# Patient Record
Sex: FEMALE | Race: BLACK OR AFRICAN AMERICAN | NOT HISPANIC OR LATINO | Employment: OTHER | URBAN - METROPOLITAN AREA
[De-identification: names, ages, dates, MRNs, and addresses within clinical notes are randomized per-mention and may not be internally consistent; named-entity substitution may affect disease eponyms.]

---

## 2023-02-24 LAB
ALANINE AMINOTRANSFERASE (SGPT) (U/L) IN SER/PLAS: 21 U/L (ref 7–45)
ALBUMIN (G/DL) IN SER/PLAS: 4.3 G/DL (ref 3.4–5)
ALKALINE PHOSPHATASE (U/L) IN SER/PLAS: 92 U/L (ref 33–136)
ANION GAP IN SER/PLAS: 12 MMOL/L (ref 10–20)
ASPARTATE AMINOTRANSFERASE (SGOT) (U/L) IN SER/PLAS: 17 U/L (ref 9–39)
BILIRUBIN TOTAL (MG/DL) IN SER/PLAS: 0.5 MG/DL (ref 0–1.2)
CALCIDIOL (25 OH VITAMIN D3) (NG/ML) IN SER/PLAS: 35 NG/ML
CALCIUM (MG/DL) IN SER/PLAS: 9.7 MG/DL (ref 8.6–10.6)
CARBON DIOXIDE, TOTAL (MMOL/L) IN SER/PLAS: 25 MMOL/L (ref 21–32)
CHLORIDE (MMOL/L) IN SER/PLAS: 110 MMOL/L (ref 98–107)
CREATININE (MG/DL) IN SER/PLAS: 0.75 MG/DL (ref 0.5–1.05)
GFR FEMALE: 89 ML/MIN/1.73M2
GLUCOSE (MG/DL) IN SER/PLAS: 117 MG/DL (ref 74–99)
POTASSIUM (MMOL/L) IN SER/PLAS: 4.4 MMOL/L (ref 3.5–5.3)
PROTEIN TOTAL: 6.6 G/DL (ref 6.4–8.2)
SODIUM (MMOL/L) IN SER/PLAS: 143 MMOL/L (ref 136–145)
THYROTROPIN (MIU/L) IN SER/PLAS BY DETECTION LIMIT <= 0.05 MIU/L: 0.82 MIU/L (ref 0.44–3.98)
THYROXINE (T4) FREE (NG/DL) IN SER/PLAS: 1.31 NG/DL (ref 0.78–1.48)
UREA NITROGEN (MG/DL) IN SER/PLAS: 13 MG/DL (ref 6–23)

## 2023-02-27 LAB
THYROGLOBULIN AB (IU/ML) IN SER/PLAS: <0.9 IU/ML (ref 0–4)
THYROGLOBULIN LC-MS/MS: ABNORMAL NG/ML (ref 1.3–31.8)
THYROGLOBULIN: 0.1 NG/ML (ref 1.3–31.8)

## 2023-03-07 ENCOUNTER — TELEPHONE (OUTPATIENT)
Dept: PRIMARY CARE | Facility: CLINIC | Age: 63
End: 2023-03-07
Payer: MEDICAID

## 2023-03-07 RX ORDER — LEVOTHYROXINE SODIUM 125 UG/1
1 TABLET ORAL DAILY
COMMUNITY
Start: 2016-04-18 | End: 2023-10-13 | Stop reason: SDUPTHER

## 2023-03-07 RX ORDER — ALBUTEROL SULFATE 90 UG/1
AEROSOL, METERED RESPIRATORY (INHALATION)
COMMUNITY
Start: 2015-03-18

## 2023-03-07 RX ORDER — ATORVASTATIN CALCIUM 20 MG/1
1 TABLET, FILM COATED ORAL DAILY
COMMUNITY
Start: 2014-11-13 | End: 2023-03-11 | Stop reason: SDUPTHER

## 2023-03-07 RX ORDER — IPRATROPIUM BROMIDE AND ALBUTEROL SULFATE 2.5; .5 MG/3ML; MG/3ML
3 SOLUTION RESPIRATORY (INHALATION) 2 TIMES DAILY
COMMUNITY
Start: 2015-10-31 | End: 2023-11-29 | Stop reason: ALTCHOICE

## 2023-03-07 RX ORDER — AMLODIPINE BESYLATE 10 MG/1
1 TABLET ORAL DAILY
COMMUNITY
Start: 2021-08-20 | End: 2023-10-20 | Stop reason: SDUPTHER

## 2023-03-07 RX ORDER — BISACODYL 5 MG
2 TABLET, DELAYED RELEASE (ENTERIC COATED) ORAL DAILY
COMMUNITY
Start: 2013-09-19 | End: 2023-10-13 | Stop reason: SDUPTHER

## 2023-03-07 RX ORDER — SPIRONOLACTONE 25 MG/1
1 TABLET ORAL DAILY
COMMUNITY
End: 2023-10-13 | Stop reason: ALTCHOICE

## 2023-03-07 RX ORDER — TRAZODONE HYDROCHLORIDE 50 MG/1
TABLET ORAL
COMMUNITY
End: 2023-10-13 | Stop reason: ALTCHOICE

## 2023-03-07 RX ORDER — FLUTICASONE PROPIONATE 50 MCG
SPRAY, SUSPENSION (ML) NASAL
COMMUNITY
Start: 2020-07-27

## 2023-03-07 RX ORDER — BISACODYL 5 MG
TABLET, DELAYED RELEASE (ENTERIC COATED) ORAL 2 TIMES DAILY
COMMUNITY
End: 2023-06-13 | Stop reason: SDUPTHER

## 2023-03-07 RX ORDER — CETIRIZINE HYDROCHLORIDE 10 MG/1
1 TABLET ORAL DAILY
COMMUNITY
Start: 2019-07-09 | End: 2024-06-03 | Stop reason: SDUPTHER

## 2023-03-07 RX ORDER — GABAPENTIN 300 MG/1
1 CAPSULE ORAL NIGHTLY
COMMUNITY
Start: 2021-05-20 | End: 2023-10-05 | Stop reason: SDUPTHER

## 2023-03-07 RX ORDER — CYCLOBENZAPRINE HCL 10 MG
TABLET ORAL
COMMUNITY
Start: 2013-09-19 | End: 2023-10-20 | Stop reason: WASHOUT

## 2023-03-07 RX ORDER — LISINOPRIL 40 MG/1
1 TABLET ORAL DAILY
COMMUNITY
Start: 2015-07-29 | End: 2023-10-13 | Stop reason: SDUPTHER

## 2023-03-07 RX ORDER — LYSINE HCL 500 MG
1 TABLET ORAL DAILY
COMMUNITY
Start: 2016-01-20 | End: 2023-11-29 | Stop reason: ALTCHOICE

## 2023-03-07 RX ORDER — VIT C/E/ZN/COPPR/LUTEIN/ZEAXAN 250MG-90MG
CAPSULE ORAL
COMMUNITY
Start: 2022-01-06 | End: 2023-10-20 | Stop reason: WASHOUT

## 2023-03-07 RX ORDER — FUROSEMIDE 40 MG/1
1 TABLET ORAL 2 TIMES DAILY
COMMUNITY
Start: 2013-09-19

## 2023-03-07 RX ORDER — DOCUSATE SODIUM 100 MG/1
1 CAPSULE, LIQUID FILLED ORAL DAILY
COMMUNITY
Start: 2013-09-19 | End: 2024-02-02 | Stop reason: SDUPTHER

## 2023-03-07 RX ORDER — METFORMIN HYDROCHLORIDE 1000 MG/1
TABLET ORAL
COMMUNITY
Start: 2013-11-12 | End: 2023-06-13 | Stop reason: SDUPTHER

## 2023-03-07 RX ORDER — LEVOTHYROXINE SODIUM 112 UG/1
1 TABLET ORAL DAILY
COMMUNITY
Start: 2016-09-03 | End: 2023-10-20 | Stop reason: ALTCHOICE

## 2023-03-07 NOTE — TELEPHONE ENCOUNTER
Patient called the Refill line requesting Atorvastatin to be sent to [facility]

## 2023-03-11 DIAGNOSIS — E78.5 DYSLIPIDEMIA: Primary | ICD-10-CM

## 2023-03-11 RX ORDER — ATORVASTATIN CALCIUM 20 MG/1
20 TABLET, FILM COATED ORAL DAILY
Qty: 90 TABLET | Refills: 2 | Status: SHIPPED | OUTPATIENT
Start: 2023-03-11 | End: 2023-03-11

## 2023-06-13 ENCOUNTER — OFFICE VISIT (OUTPATIENT)
Dept: PRIMARY CARE | Facility: CLINIC | Age: 63
End: 2023-06-13
Payer: MEDICAID

## 2023-06-13 VITALS
HEIGHT: 64 IN | DIASTOLIC BLOOD PRESSURE: 77 MMHG | WEIGHT: 177.2 LBS | SYSTOLIC BLOOD PRESSURE: 124 MMHG | BODY MASS INDEX: 30.25 KG/M2 | TEMPERATURE: 96.7 F | OXYGEN SATURATION: 96 % | HEART RATE: 55 BPM

## 2023-06-13 DIAGNOSIS — E11.69 DIABETES MELLITUS TYPE 2 IN OBESE: ICD-10-CM

## 2023-06-13 DIAGNOSIS — K59.00 CONSTIPATION, UNSPECIFIED CONSTIPATION TYPE: Primary | ICD-10-CM

## 2023-06-13 DIAGNOSIS — E66.9 DIABETES MELLITUS TYPE 2 IN OBESE: ICD-10-CM

## 2023-06-13 PROBLEM — F32.A DEPRESSION: Status: ACTIVE | Noted: 2023-06-13

## 2023-06-13 PROBLEM — E78.5 DYSLIPIDEMIA: Status: ACTIVE | Noted: 2023-06-13

## 2023-06-13 PROBLEM — E89.0 HYPOTHYROIDISM, POSTSURGICAL: Status: ACTIVE | Noted: 2023-06-13

## 2023-06-13 PROBLEM — C73 FOLLICULAR CARCINOMA OF THYROID GLAND (MULTI): Status: ACTIVE | Noted: 2023-06-13

## 2023-06-13 PROBLEM — M54.50 LOW BACK PAIN: Status: ACTIVE | Noted: 2023-06-13

## 2023-06-13 PROBLEM — J43.9 EMPHYSEMA LUNG (MULTI): Status: ACTIVE | Noted: 2023-06-13

## 2023-06-13 PROBLEM — E11.9 ENCOUNTER FOR DIABETIC FOOT EXAM (MULTI): Status: ACTIVE | Noted: 2023-06-13

## 2023-06-13 PROBLEM — K21.9 ESOPHAGEAL REFLUX: Status: ACTIVE | Noted: 2023-06-13

## 2023-06-13 PROCEDURE — 99213 OFFICE O/P EST LOW 20 MIN: CPT | Performed by: STUDENT IN AN ORGANIZED HEALTH CARE EDUCATION/TRAINING PROGRAM

## 2023-06-13 PROCEDURE — 3074F SYST BP LT 130 MM HG: CPT | Performed by: STUDENT IN AN ORGANIZED HEALTH CARE EDUCATION/TRAINING PROGRAM

## 2023-06-13 PROCEDURE — 4010F ACE/ARB THERAPY RXD/TAKEN: CPT | Performed by: STUDENT IN AN ORGANIZED HEALTH CARE EDUCATION/TRAINING PROGRAM

## 2023-06-13 PROCEDURE — 3044F HG A1C LEVEL LT 7.0%: CPT | Performed by: STUDENT IN AN ORGANIZED HEALTH CARE EDUCATION/TRAINING PROGRAM

## 2023-06-13 PROCEDURE — 3078F DIAST BP <80 MM HG: CPT | Performed by: STUDENT IN AN ORGANIZED HEALTH CARE EDUCATION/TRAINING PROGRAM

## 2023-06-13 RX ORDER — BISACODYL 5 MG
5 TABLET, DELAYED RELEASE (ENTERIC COATED) ORAL DAILY PRN
Qty: 30 TABLET | Refills: 3 | Status: SHIPPED | OUTPATIENT
Start: 2023-06-13 | End: 2023-06-13

## 2023-06-13 RX ORDER — METFORMIN HYDROCHLORIDE 1000 MG/1
1000 TABLET ORAL
Qty: 180 TABLET | Refills: 3 | Status: SHIPPED | OUTPATIENT
Start: 2023-06-13 | End: 2023-06-13

## 2023-06-13 ASSESSMENT — PAIN SCALES - GENERAL: PAINLEVEL: 10-WORST PAIN EVER

## 2023-06-13 NOTE — PROGRESS NOTES
"Subjective   Patient ID: Katherine Sin is a 62 y.o. female who presents for Annual Exam.    HPI   62 year female HTN, HFpEF, GERD, follicular thyroid carcinoma s/p thyroidectomy in 2012, T2DM, COPD presenting for paperwork to her dentist.       #T2DM  Last A1c: was at goal 5 months ago  Medications:    Metformin 2 g daily  Along with diet control  Currently denies any symptoms     #HTN  At goal   Medications:   Amlodipine 10 mg daily   Lisinopril 40 mg daily   Denies headaches, vision changes, chest pain, leg swelling      #Chronic constipation  - Patient is reporting chronic constipation with better improvement of her symptoms and requesting refill.      Review of Systems  Review of systems negative unless stated in HPI  Objective   Blood Pressure 124/77 (BP Location: Right arm, Patient Position: Sitting)   Pulse 55   Temperature 35.9 °C (96.7 °F) (Temporal)   Height 1.626 m (5' 4\")   Weight 80.4 kg (177 lb 3.2 oz)   Oxygen Saturation 96%   Body Mass Index 30.42 kg/m²     Physical Exam  Constitutional: Well developed, awake, alert, oriented x3  Head and Face: NCAT  Eyes: Normal external exam, EOMI  Cardiovascular: RRR, S1/S2, no murmurs, rubs, or gallops, radial pulses +2, no edema of extremities  Pulmonary: CTAB, no respiratory distress.  Abdomen: +BS, soft, non-tender, nondistended, no guarding or rebound, no masses noted      Assessment/Plan   62 year female HTN, HFpEF, GERD, follicular thyroid carcinoma s/p thyroidectomy in 2012, T2DM, COPD presenting for paperwork to her dentist.  Medical conditions, refilled Dulcolax and metformin     Patient is reporting multiple dental concerning and provided paperwork from her internist requesting documentation of the current medical condition status fascially diabetes and hypertension.  Both are controlled on current medications.    Diabetes mellitus type 2 in obese (CMS/[facility])  -Controlled  -     metFORMIN (Glucophage) 1,000 mg tablet; Take 1 tablet (1,000 mg) by " mouth 2 times a day with meals.    Constipation, unspecified constipation type  -     bisacodyl (Dulcolax) 5 mg EC tablet; Take 1 tablet (5 mg) by mouth once daily as needed for constipation.  -Discussed increase fiber in diet and proper water intake    Hypertension  - Controlled  - Continue current medication regimen    Return to clinic in 8 to 12 weeks or sooner as needed       Discussed with attending Dr. Sandy Davey [facility]  Family medicine department PGY 3

## 2023-06-21 NOTE — PROGRESS NOTES
I reviewed with the resident the medical history and the resident’s findings on physical examination.  I discussed with the resident the patient’s diagnosis and concur with the treatment plan as documented in the resident note.   Here for follow up DM and Htn and with constipation,  DM: stable, cont current meds  HTN: stable  Constipation : refills provided.  Gildardo Delgado MD

## 2023-07-26 ENCOUNTER — HOSPITAL ENCOUNTER (OUTPATIENT)
Dept: DATA CONVERSION | Facility: HOSPITAL | Age: 63
End: 2023-07-26
Attending: OPHTHALMOLOGY | Admitting: OPHTHALMOLOGY
Payer: MEDICAID

## 2023-07-26 DIAGNOSIS — H26.9 UNSPECIFIED CATARACT: ICD-10-CM

## 2023-07-26 DIAGNOSIS — H25.811 COMBINED FORMS OF AGE-RELATED CATARACT, RIGHT EYE: ICD-10-CM

## 2023-07-26 LAB — POCT GLUCOSE: 105 MG/DL (ref 74–99)

## 2023-08-18 ENCOUNTER — APPOINTMENT (OUTPATIENT)
Dept: LAB | Facility: LAB | Age: 63
End: 2023-08-18
Payer: MEDICAID

## 2023-08-18 LAB
ALANINE AMINOTRANSFERASE (SGPT) (U/L) IN SER/PLAS: 18 U/L (ref 7–45)
ALBUMIN (G/DL) IN SER/PLAS: 4.1 G/DL (ref 3.4–5)
ALBUMIN (MG/L) IN URINE: <7 MG/L
ALBUMIN/CREATININE (UG/MG) IN URINE: NORMAL UG/MG CRT (ref 0–30)
ALKALINE PHOSPHATASE (U/L) IN SER/PLAS: 83 U/L (ref 33–136)
ANION GAP IN SER/PLAS: 13 MMOL/L (ref 10–20)
ASPARTATE AMINOTRANSFERASE (SGOT) (U/L) IN SER/PLAS: 16 U/L (ref 9–39)
BILIRUBIN TOTAL (MG/DL) IN SER/PLAS: 0.4 MG/DL (ref 0–1.2)
CALCIUM (MG/DL) IN SER/PLAS: 10.3 MG/DL (ref 8.6–10.6)
CARBON DIOXIDE, TOTAL (MMOL/L) IN SER/PLAS: 27 MMOL/L (ref 21–32)
CHLORIDE (MMOL/L) IN SER/PLAS: 109 MMOL/L (ref 98–107)
CHOLESTEROL (MG/DL) IN SER/PLAS: 137 MG/DL (ref 0–199)
CHOLESTEROL IN HDL (MG/DL) IN SER/PLAS: 60.2 MG/DL
CHOLESTEROL/HDL RATIO: 2.3
CREATININE (MG/DL) IN SER/PLAS: 0.68 MG/DL (ref 0.5–1.05)
CREATININE (MG/DL) IN URINE: 64.1 MG/DL (ref 20–320)
ESTIMATED AVERAGE GLUCOSE FOR HBA1C: 128 MG/DL
GFR FEMALE: >90 ML/MIN/1.73M2
GLUCOSE (MG/DL) IN SER/PLAS: 86 MG/DL (ref 74–99)
HEMOGLOBIN A1C/HEMOGLOBIN TOTAL IN BLOOD: 6.1 %
LDL: 62 MG/DL (ref 0–99)
POTASSIUM (MMOL/L) IN SER/PLAS: 4.7 MMOL/L (ref 3.5–5.3)
PROTEIN TOTAL: 6.8 G/DL (ref 6.4–8.2)
SODIUM (MMOL/L) IN SER/PLAS: 144 MMOL/L (ref 136–145)
THYROTROPIN (MIU/L) IN SER/PLAS BY DETECTION LIMIT <= 0.05 MIU/L: 0.05 MIU/L (ref 0.44–3.98)
THYROXINE (T4) FREE (NG/DL) IN SER/PLAS: 1.43 NG/DL (ref 0.78–1.48)
TRIGLYCERIDE (MG/DL) IN SER/PLAS: 75 MG/DL (ref 0–149)
UREA NITROGEN (MG/DL) IN SER/PLAS: 10 MG/DL (ref 6–23)
VLDL: 15 MG/DL (ref 0–40)

## 2023-08-23 ENCOUNTER — HOSPITAL ENCOUNTER (OUTPATIENT)
Dept: DATA CONVERSION | Facility: HOSPITAL | Age: 63
End: 2023-08-23
Attending: OPHTHALMOLOGY

## 2023-08-23 DIAGNOSIS — H25.812 COMBINED FORMS OF AGE-RELATED CATARACT, LEFT EYE: ICD-10-CM

## 2023-08-28 RX ORDER — KETOROLAC TROMETHAMINE 5 MG/ML
SOLUTION OPHTHALMIC
COMMUNITY
Start: 2023-07-28 | End: 2023-10-13 | Stop reason: SDUPTHER

## 2023-08-28 RX ORDER — PREDNISOLONE ACETATE 10 MG/ML
SUSPENSION/ DROPS OPHTHALMIC
COMMUNITY
Start: 2023-08-04 | End: 2023-11-29 | Stop reason: ALTCHOICE

## 2023-08-28 RX ORDER — HYDROXYZINE HYDROCHLORIDE 10 MG/1
TABLET, FILM COATED ORAL
COMMUNITY
Start: 2022-11-21 | End: 2024-06-03 | Stop reason: SDUPTHER

## 2023-08-28 RX ORDER — HYDROXYUREA 500 MG/1
CAPSULE ORAL
COMMUNITY
Start: 2015-10-27

## 2023-08-28 RX ORDER — CALCIUM CARBONATE 600 MG
TABLET ORAL
COMMUNITY
Start: 2023-01-24 | End: 2023-10-13 | Stop reason: ALTCHOICE

## 2023-08-28 RX ORDER — PRAMOXINE HYDROCHLORIDE 10 MG/ML
LOTION TOPICAL
COMMUNITY
Start: 2017-02-07 | End: 2023-10-13 | Stop reason: ALTCHOICE

## 2023-09-30 NOTE — H&P
History of Present Illness:   History Present Illness:  Reason for surgery: cataract right eye   HPI:    visually significant cataract in the right eye requiring cataract extraction and intraocular lens insertion    Allergies:        Allergies:  ·  ibuprofen : Itching  ·  tramadol : Itching  ·  Topamax : Itching  ·  Dilaudid : Itching    Home Medication Review:   Home Medications Reviewed: yes     Impression/Procedure:   ·  Impression and Planned Procedure: visually significant cataract in the right eye requiring cataract extraction and intraocular lens insertion       ERAS (Enhanced Recovery After Surgery):  ·  ERAS Patient: no       Physical Exam by System:    Constitutional: Well developed, awake/alert/oriented  x3, no distress, alert and cooperative   Respiratory/Thorax: Patent airways, CTAB, normal  breath sounds with good chest expansion, thorax symmetric   Cardiovascular: Regular, rate and rhythm, no murmurs,  2+ equal pulses of the extremities, normal S 1and S 2     Consent:   COVID-19 Consent:  ·  COVID-19 Risk Consent Surgeon has reviewed key risks related to the risk of yehuda COVID-19 and if they contract COVID-19 what the risks are.     Attestation:   Note Completion:  I am a:  Resident/Fellow   Attending Attestation I saw and evaluated the patient.  I personally obtained the key and critical portions of the history and physical exam or was physically present for key and  critical portions performed by the resident/fellow. I reviewed the resident/fellow?s documentation and discussed the patient with the resident/fellow.  I agree with the resident/fellow?s medical decision making as documented in the note.     I personally evaluated the patient on 26-Jul-2023         Electronic Signatures:  David Kemp (Resident))  (Signed 26-Jul-2023 07:09)   Authored: History of Present Illness, Allergies, Home  Medication Review, Impression/Procedure, ERAS, Physical Exam, Consent, Note  Completion  Dior Cao)  (Signed 26-Jul-2023 12:55)   Authored: Note Completion   Co-Signer: History of Present Illness, Allergies, Home Medication Review, Impression/Procedure, ERAS, Physical Exam, Consent, Note Completion      Last Updated: 26-Jul-2023 12:55 by Dior Cao)

## 2023-10-02 NOTE — OP NOTE
Post Operative Note:     PreOp Diagnosis: cataract right eye   Post-Procedure Diagnosis: pseudophakia OD   Procedure: cataract extraction and intraocular lens  insertion right eye   Surgeon: Nash SIMENTAL   Resident/Fellow/Other Assistant: Viridiana FARR   Anesthesia: mac   Estimated Blood Loss (mL): none   Specimen: no   Findings: visually significant cataract right eye     Operative Report Dictated:  Dictation: not applicable - note contains Operative  Report    Operative Report:    The patient was placed in the supine position on the operating room table where appropriate blood pressure and cardiac monitoring were initiated. The patient was  prepped and draped in the usual sterile fashion for intraocular surgery. After draping, the patient went into a panic attack and requested the drapes to be removed. More Versid was given to the patient (dose per anesthesia note) and the patient was after  about 10 mins amenable to proceed with the surgery. The was prepped and raped again. This included instillation of Betadine 5% onto the ocular surface followed by irrigation with balance salt solution a minute or two later. A lid speculum was placed and  the operating microscope was positioned. One paracentesis stab incision was made to the left of the planned cataract incision with a 15-degree supersharp blade. 1 ml of preservative free lidocaine was injected into the AC. Viscoat was used to replace  the aqueous humor. A temporal clear corneal wound was fashioned beginning at the limbus with a 2.2 mm keratome, extending 2 mm into clear cornea before entering the anterior chamber. A continuous tear circular capsulorhexis of approximately 5 mm in diameter  was performed. Hydrodissection was performed using a Urias canula. The endothelium was coated with viscoat again. Using the Ozil handpiece on the [employer] Lens Removal System, the nucleus was emulsified and aspirated using a divide-and-conquer technique.  Residual cortex was  removed from the eye with the irrigation/aspiration bimanually. ProVisc was used to inflate the capsular bag. The lens implant was inspected and found to be free of visible defects. The lens used was an Hugo model AU00T0, power 8.50  diopter intraocular lens. The lens was inserted into the capsular bag using the [place] insertion mechanism. The lens was centered with a Tipton hook. Residual viscoelastic was removed from the eye with the irrigation/aspiration instrument. Preservative  free moxifloxacin was injected  intracameral. The wounds were checked and found to be watertight. The lid speculum was removed and the eye was dressed with Maxitrol ointment, eye pad, tape, and shield. The patient tolerated the procedure well, and there  were no complications.       Attestation:   Note Completion:  I am a: Resident/Fellow   Attending Attestation I was present for the entire procedure          Electronic Signatures:  David Kemp (Resident))  (Signed 26-Jul-2023 12:50)   Authored: Post Operative Note, Note Completion  Dior Cao)  (Signed 26-Jul-2023 13:32)   Authored: Post Operative Note, Note Completion   Co-Signer: Post Operative Note, Note Completion      Last Updated: 26-Jul-2023 13:32 by Dior Cao)

## 2023-10-05 ENCOUNTER — OFFICE VISIT (OUTPATIENT)
Dept: PULMONOLOGY | Facility: HOSPITAL | Age: 63
End: 2023-10-05
Payer: MEDICAID

## 2023-10-05 ENCOUNTER — TELEPHONE (OUTPATIENT)
Dept: PRIMARY CARE | Facility: CLINIC | Age: 63
End: 2023-10-05

## 2023-10-05 ENCOUNTER — PHARMACY VISIT (OUTPATIENT)
Dept: PHARMACY | Facility: CLINIC | Age: 63
End: 2023-10-05
Payer: MEDICAID

## 2023-10-05 VITALS
BODY MASS INDEX: 29.42 KG/M2 | SYSTOLIC BLOOD PRESSURE: 123 MMHG | WEIGHT: 176.6 LBS | HEIGHT: 65 IN | HEART RATE: 57 BPM | OXYGEN SATURATION: 97 % | DIASTOLIC BLOOD PRESSURE: 70 MMHG | RESPIRATION RATE: 20 BRPM | TEMPERATURE: 98.4 F

## 2023-10-05 DIAGNOSIS — E66.9 DIABETES MELLITUS TYPE 2 IN OBESE: Primary | ICD-10-CM

## 2023-10-05 DIAGNOSIS — E11.69 DIABETES MELLITUS TYPE 2 IN OBESE: Primary | ICD-10-CM

## 2023-10-05 DIAGNOSIS — R06.09 DOE (DYSPNEA ON EXERTION): Primary | ICD-10-CM

## 2023-10-05 DIAGNOSIS — J43.9 PULMONARY EMPHYSEMA, UNSPECIFIED EMPHYSEMA TYPE (MULTI): ICD-10-CM

## 2023-10-05 PROCEDURE — 1160F RVW MEDS BY RX/DR IN RCRD: CPT | Performed by: NURSE PRACTITIONER

## 2023-10-05 PROCEDURE — 1159F MED LIST DOCD IN RCRD: CPT | Performed by: NURSE PRACTITIONER

## 2023-10-05 PROCEDURE — 3078F DIAST BP <80 MM HG: CPT | Performed by: NURSE PRACTITIONER

## 2023-10-05 PROCEDURE — 4010F ACE/ARB THERAPY RXD/TAKEN: CPT | Performed by: NURSE PRACTITIONER

## 2023-10-05 PROCEDURE — 99214 OFFICE O/P EST MOD 30 MIN: CPT | Performed by: NURSE PRACTITIONER

## 2023-10-05 PROCEDURE — RXMED WILLOW AMBULATORY MEDICATION CHARGE

## 2023-10-05 PROCEDURE — 3044F HG A1C LEVEL LT 7.0%: CPT | Performed by: NURSE PRACTITIONER

## 2023-10-05 PROCEDURE — 3074F SYST BP LT 130 MM HG: CPT | Performed by: NURSE PRACTITIONER

## 2023-10-05 PROCEDURE — 1126F AMNT PAIN NOTED NONE PRSNT: CPT | Performed by: NURSE PRACTITIONER

## 2023-10-05 RX ORDER — GABAPENTIN 300 MG/1
300 CAPSULE ORAL NIGHTLY
Qty: 30 CAPSULE | Refills: 0 | Status: SHIPPED | OUTPATIENT
Start: 2023-10-05 | End: 2024-06-03

## 2023-10-05 SDOH — ECONOMIC STABILITY: FOOD INSECURITY: WITHIN THE PAST 12 MONTHS, THE FOOD YOU BOUGHT JUST DIDN'T LAST AND YOU DIDN'T HAVE MONEY TO GET MORE.: NEVER TRUE

## 2023-10-05 SDOH — ECONOMIC STABILITY: FOOD INSECURITY: WITHIN THE PAST 12 MONTHS, YOU WORRIED THAT YOUR FOOD WOULD RUN OUT BEFORE YOU GOT MONEY TO BUY MORE.: NEVER TRUE

## 2023-10-05 ASSESSMENT — ENCOUNTER SYMPTOMS
CHILLS: 0
EYE ITCHING: 0
MYALGIAS: 0
AGITATION: 0
WEAKNESS: 0
LOSS OF SENSATION IN FEET: 1
FATIGUE: 0
DEPRESSION: 0
CHEST TIGHTNESS: 0
NERVOUS/ANXIOUS: 0
VOICE CHANGE: 0
TREMORS: 0
DIZZINESS: 0
SHORTNESS OF BREATH: 0
SINUS PRESSURE: 0
JOINT SWELLING: 0
ACTIVITY CHANGE: 0
ARTHRALGIAS: 0
COUGH: 0
SINUS PAIN: 0
BACK PAIN: 1
UNEXPECTED WEIGHT CHANGE: 0
SORE THROAT: 0
OCCASIONAL FEELINGS OF UNSTEADINESS: 0
SPEECH DIFFICULTY: 0
PALPITATIONS: 0
SLEEP DISTURBANCE: 0
APPETITE CHANGE: 0
STRIDOR: 0
TROUBLE SWALLOWING: 0
HEADACHES: 0
RHINORRHEA: 0
WHEEZING: 0
EYE REDNESS: 0
FEVER: 0

## 2023-10-05 ASSESSMENT — PATIENT HEALTH QUESTIONNAIRE - PHQ9
1. LITTLE INTEREST OR PLEASURE IN DOING THINGS: NOT AT ALL
2. FEELING DOWN, DEPRESSED OR HOPELESS: NOT AT ALL
SUM OF ALL RESPONSES TO PHQ9 QUESTIONS 1 & 2: 0

## 2023-10-05 ASSESSMENT — PAIN SCALES - GENERAL: PAINLEVEL: 0-NO PAIN

## 2023-10-05 NOTE — PATIENT INSTRUCTIONS
63 year old AAF (current smoker- 30 pack years ) here for follow up for PHILLIPS and pulmonary nodules. Last seen in clinic on 4/28/23.     1. Dyspnea on exertion: has been going on for 8+ years since back surgery. Emphysema on CT as well as history of NICM, but likely also a component of deconditioning as well. PFTs in 1/2020 without obstruction. Improvement in symptoms and has only been using spiriva and proair PRN. CT chest with some mosaic attenuation - PFTs 2021  stable from 2020. 6MWT WNL -> no need for oxygen.  - continue proair 2 puffs every 4-6 hours as needed for shortness of breath     2. Allergic rhinitis: PRN cetirizine and flonase - has been using the flonase intermittently at night   - continue fluticasone (flonase) 1 spray each nostril 1-2 x per day - remember to aim towards your ear   - continue nasal saline over the counter - use 2-3 x per day to rinse out nasal passages and keep them moist   - continue cetirizine (zyrtec) 10mg daily as needed for allergy symptoms     3. Pulmonary nodules : Hx of thyroid cancer s/p thyroidectomy in 2012 - Nodules stable since 11/2019 and even some from 2014   - continue annual CT scan through endocrinology - when no longer on surveillance will qualify for lung cancer screening.     4. Smoking cessation: smoked 1 PPD x 20- 25 years years and then about 6 cigarettes per day the rest of the time ~ 30 pack years. Not interested in quitting at this time. smokes 1/2 ppd   - > 5 minutes smoking cessation counseling    5. Pulmonary edema: seen on CXR. Seen by cardiology back in 2019. Minimal PHILLIPS today in clinic.   - pt states she feels well and does not want a referral back to see cardiology     Thank you for visiting the Pulmonary clinic today!   Return to clinic 6 months with breathing tests or sooner if needed   Berta Valdez CNP  My office number is [phone] - Cammy is my  and So is my nurse.   [phone] - scheduling

## 2023-10-05 NOTE — PROGRESS NOTES
Patient: Katherine Sin    [id number]  : 1960 -- AGE 63 y.o.    Provider: DORIS Amador-CNP     Location [facility]   Service Date: 10/5/2023              [facility] Pul[facility] Medicine [facility]  Follow up visit Note      HISTORY OF PRESENT ILLNESS       HISTORY OF PRESENT ILLNESS   63 year old AAF (current smoker- 30 pack years ) here for follow up for PHILLIPS and pulmonary nodules. Last seen in clinic on 23.     She had a little virus for which she presented to the ED 2 weeks ago. They told her she had a little water on her lungs. She states they did not give her anything for it.  She is upset they didn't do much for her -- gave her a breathing treatment and sent her home. She states she is takign her lasix. She has not seen cardiology since 2019. She has a PRN albuterol that she does not use that often. She does not have a nebulizer at home to do breathing treatments.  She was on the sprivia PRN and did not use it. She is not interested in any maintenance inhalers at this time.    She has PHILLIPS with going up stairs/ hill. She still is coughing some that is productive with clear mucous. She feels cough is improving.  She has noticed some wheezing when she was sick. She denies any SOB at rest or CP. She had some chest pain when she was sick, but it has since resolved. She takes cetirizine and flonase as needed - has not needed recently. She has some intermittent GERD symptoms depending on what she eats - once a month. She is still smoking 8-10 cigarettes.      Dr. Bowser -- Interim 23   Routine follow up. Patient is previosuly followed up by Berta Judge  No new symptoms. She continues to smoke half a pack per day. She has been smoking since age 14. She is not ready to quit and not willing to consider treatment such as nicotine replacement therapy or pharmacotherapy. She denies any emergency room visit sick visits or hospitalization for any breathing  problems. She had lost a few pounds intentionally. She remains active and can go on daily walks on level ground. She does use a walker because of back pain. She continues to have chronic cough productive of yellowish sputum that is not any worse over the last few days to week. Denies fever chills night sweats. Denies wheezing. She uses albuterol as needed only when she goes on walks. No nocturnal awakening with shortness of breath.      Since last visit on 12/13/21, patient states that her breathing has been good and hasn't had any problems. States that she really doesn’t even need to use her rescue. States that if she were to walk 2 city blocks, she might need to stop and catch her breath and use her inhaler. Otherwise, is feeling just fine. Denies cough, wheezing, SOB at rest, chest pain. Has very mild and intermittent GERD symptoms. Allergies under good control with use of daily flonase, nasal saline and nightly zyrtec. Sleeping well. No recent visits to UC or ER and has not needed ATB or steroids. Since last visit, she has cut back her smoking to about 2-3 cigarettes/day. If she is drinking beer (usually 2 days a week) she will smoke a little more upwards of about 6/day.    12/13/21: HPI: Since last visit she has been doing well. She uses her inhalers PRN - spiriva handihaler and proair. She has not used either of her inhalers for several weeks. She denies any cough, wheezing, SOB at rest, or CP. She has PHILLIPS with walking > blocks. She has occasional runny nose/nasal congestion/ post nasal drip. She states her nose is dry and stuffy at night. She uses her flonase/nasal saline at night before she goes to bed and in the morning. She will take cetirizine PRN for worsening symptoms. She has rare GERD symptoms - depends on what she eats. She is still smoking and is smoking 2 packs a week.   CAT today 13    11/3/20: She has PHILLIPS, but denies SOB at rest. She states she had back surgery in 2016 and it has been that way  since then and she never returned to her baseline. She has PHILLIPS with walking up or down stairs or a hill. She is able to walk better and further either with her rollator or with the shopping cart at the grocery store. She has PRN proair and it does help when she uses it, but she rarely uses it. She does have an aid that helps her around the house. She denies any cough or wheezing. She takes cetrizine 10mg dialy as needed for allergies as well as flonase PRN. She has not needed either recently. She has a history of GERD for which she takes PRN famotidine. She has not needed it recently She denies any CP or LE edema.   CAT today 22     Previous pulmonary history: She has no history of recurrent infections, or lung disease as a child. She had no previous lung hx, never on oxygen or inhaler therapy. .    Inhalers/nebulized medications: PRN proair     Hospitalization History:   She has not been hospitalized over the last year for breathing related problem.    Sleep history: She states she does snore, but denies witnessed apneas. She denies any trouble falling asleep, but dose doze during the day.    Comorbidities:  - DMII   - HTN   - GERD - on famotidine PRN - does not need often   - HLD  - thyroid cancer - s/p thyroidectomy in 2012   - left foot surgery in 1970s  - right ankle surgery - 2007   - NICM - diastolic heart failure     SH:  smoking: smoked 1 PPD x 20- 25 years years and then about 6 cigarettes per day the rest of the time ~ 30 pack years   drinkin drink per week   illicit drug use: none      Occupation: She is now on disability related to her heart failure - previously was working in a day care. No known toxic exposures.     Family History: No family history of lung diseases or cancer        ALLERGIES AND MEDICATIONS     ALLERGIES  Allergies   Allergen Reactions    Hydromorphone Itching and Other    Ibuprofen Itching and Other    Topiramate Itching and Other    Tramadol Itching and Other        MEDICATIONS  Current Outpatient Medications   Medication Sig Dispense Refill    albuterol 90 mcg/actuation inhaler Inhale.      amLODIPine (Norvasc) 10 mg tablet Take 1 tablet (10 mg) by mouth once daily.      amLODIPine (Norvasc) 10 mg tablet TAKE 1 TABLET BY MOUTH ONCE DAILY AS DIRECTED 90 tablet 3    atorvastatin (Lipitor) 20 mg tablet TAKE 1 TABLET (20 MG) BY MOUTH ONCE DAILY. 90 tablet 2    benzalkonium chloride 0.13 % towelette Please check blood pressure daily and documen numbers into a journal. Please bring journal into clinic whenever you see your doctor.      bisacodyl (Dulcolax) 5 mg EC tablet Take 2 tablets (10 mg) by mouth once daily.      bisacodyl (Dulcolax) 5 mg EC tablet TAKE 1 TABLET BY MOUTH ONCE DAILY AS NEEDED FOR CONSTIPATION 30 tablet 3    calcium carbonate 600 mg calcium (1,500 mg) tablet       calcium carbonate 600 mg calcium (1,500 mg) tablet TAKE 1 TABLET BY MOUTH TWO TIMES A DAY WITH MEALS 60 tablet 6    calcium carbonate-vit D3-min 600 mg calcium- 400 unit tablet Take 1 tablet by mouth once daily.      cetirizine (ZyrTEC) 10 mg tablet Take 1 tablet (10 mg) by mouth once daily.      cholecalciferol (Vitamin D-3) 25 MCG (1000 UT) capsule TAKE 1 CAPSULE BY MOUTH DAILY 30 capsule 8    cyclobenzaprine (Flexeril) 10 mg tablet Take by mouth.      docusate sodium (Colace) 100 mg capsule Take 1 capsule (100 mg) by mouth once daily.      fluticasone (Flonase) 50 mcg/actuation nasal spray Administer into affected nostril(s).      furosemide (Lasix) 40 mg tablet Take 1 tablet (40 mg) by mouth 2 times a day.      gabapentin (Neurontin) 300 mg capsule Take 1 tablet by mouth once daily at bedtime.      hydroxyurea (Hydrea) 500 mg capsule 3 Tablet      hydrOXYzine HCL (Atarax) 10 mg tablet       levothyroxine (Synthroid, Levoxyl) 100 mcg tablet TAKE 1 TABLET DAILY AS DIRECTED. 90 tablet 2    lisinopril 40 mg tablet Take 1 tablet (40 mg) by mouth once daily.      lisinopril 40 mg tablet TAKE 1  TABLET BY MOUTH ONCE DAILY AS DIRECTED 90 tablet 3    metFORMIN (Glucophage) 1,000 mg tablet TAKE 1 TABLET BY MOUTH TWO TIMES A DAY WITH MEALS 180 tablet 3    cholecalciferol (Vitamin D-3) 25 MCG (1000 UT) capsule Take by mouth.      cyclobenzaprine (Flexeril) 10 mg tablet TAKE ONE TABLET BY MOUTH AT BEDTIME AS DIRECTED 2700 tablet 1    ipratropium-albuteroL (Duo-Neb) 0.5-2.5 mg/3 mL nebulizer solution Inhale 3 mL twice a day.      ipratropium/albuterol sulfate (COMBIVENT RESPIMAT INHL) Inhale twice a day.      ketorolac (Acular) 0.5 % ophthalmic solution       ketorolac (Acular) 0.5 % ophthalmic solution INSTILL 1 DROP FOUR TIMES A DAY INTO THE RIGHT EYE (Patient not taking: Reported on 10/5/2023) 5 mL 2    levothyroxine (Synthroid, Levoxyl) 112 mcg tablet Take 1 tablet (112 mcg) by mouth once daily.      levothyroxine (Synthroid, Levoxyl) 112 mcg tablet TAKE ONE TABLET BY MOUTH EVERY DAY (Patient not taking: Reported on 10/5/2023) 90 tablet 3    levothyroxine (Synthroid, Levoxyl) 125 mcg tablet Take 1 tablet (125 mcg) by mouth once daily.      pramoxine (Sarna Sensitive) 1 % lotion 1 Application      prednisoLONE acetate (Pred-Forte) 1 % ophthalmic suspension       prednisoLONE acetate (Pred-Forte) 1 % ophthalmic suspension INSTILL 1 DROP INTO AFFECTED EYE(S) FOUR TIMES A DAY (Patient not taking: Reported on 10/5/2023) 10 mL 1    prednisoLONE acetate (Pred-Forte) 1 % ophthalmic suspension INSTILL 1 DROP 4 TIMES DAILY. (Patient not taking: Reported on 10/5/2023) 10 mL 1    prednisoLONE acetate (Pred-Forte) 1 % ophthalmic suspension INSTILL 1 DROP IN EACH AFFECTED EYE FOUR TIMES A DAY (Patient not taking: Reported on 10/5/2023) 5 mL 0    prednisoLONE acetate (Pred-Forte) 1 % ophthalmic suspension INSTILL 1 DROP IN EACH AFFECTED EYE FOUR TIMES A DAY (Patient not taking: Reported on 10/5/2023) 5 mL 0    spironolactone (Aldactone) 25 mg tablet Take 1 tablet (25 mg) by mouth once daily.      traZODone (Desyrel) 50 mg  tablet Take by mouth.       No current facility-administered medications for this visit.         PAST HISTORY     PAST SURGICAL HISTORY  Past Surgical History:   Procedure Laterality Date    CARDIAC CATHETERIZATION  07/23/2013    Cardiac Cath Procedure Outcome:    COLONOSCOPY  06/24/2013    Complete Colonoscopy    THYROID SURGERY  07/23/2013    Thyroid Surgery       IMMUNIZATION HISTORY  Immunization History   Administered Date(s) Administered    Flu vaccine (IIV4), preservative free *Check age/dose* 02/02/2017    Influenza, Unspecified 10/07/2015, 03/06/2018, 12/17/2019, 10/19/2020, 10/15/2021    Influenza, seasonal, injectable 10/07/2015    Pfizer COVID-19 vaccine, bivalent, age 12 years and older (30 mcg/0.3 mL) 12/14/2022    Pfizer Purple Cap SARS-CoV-2 05/21/2021, 06/11/2021, 12/09/2021    Pneumococcal polysaccharide vaccine, 23-valent, age 2 years and older (PNEUMOVAX 23) 10/30/2015, 07/09/2019    Tdap vaccine, age 7 year and older (BOOSTRIX) 10/19/2020    Zoster, Unspecified 07/09/2019, 09/17/2019       FAMILY HISTORY  Family History   Problem Relation Name Age of Onset    Diabetes Sister      Other (TYPE C VIRAL HEPATITIS) Brother         REVIEW OF SYSTEMS     REVIEW OF SYSTEMS  Review of Systems   Constitutional:  Negative for activity change, appetite change, chills, fatigue, fever and unexpected weight change.   HENT:  Negative for congestion, postnasal drip, rhinorrhea, sinus pressure, sinus pain, sneezing, sore throat, trouble swallowing and voice change.    Eyes:  Negative for redness and itching.   Respiratory:  Negative for cough, chest tightness, shortness of breath, wheezing and stridor.    Cardiovascular:  Negative for chest pain, palpitations and leg swelling.   Musculoskeletal:  Positive for back pain. Negative for arthralgias, joint swelling and myalgias.   Skin:  Negative for rash.   Allergic/Immunologic: Negative for environmental allergies and immunocompromised state.   Neurological:   "Negative for dizziness, tremors, speech difficulty, weakness and headaches.   Psychiatric/Behavioral:  Negative for agitation and sleep disturbance. The patient is not nervous/anxious.        PHYSICAL EXAM     VITAL SIGNS: /70 (BP Location: Right arm, Patient Position: Sitting, BP Cuff Size: Large adult)   Pulse 57   Temp 36.9 °C (98.4 °F) (Temporal)   Resp 20   Ht 1.638 m (5' 4.5\")   Wt 80.1 kg (176 lb 9.6 oz)   SpO2 97%   BMI 29.85 kg/m²      CURRENT WEIGHT: [unfilled]  BMI: [unfilled]  PREVIOUS WEIGHTS:  Wt Readings from Last 3 Encounters:   10/05/23 80.1 kg (176 lb 9.6 oz)   06/13/23 80.4 kg (177 lb 3.2 oz)   04/18/23 81.2 kg (179 lb)       Physical Exam  Vitals reviewed.   Constitutional:       General: She is not in acute distress.     Appearance: Normal appearance. She is not ill-appearing or toxic-appearing.   HENT:      Head: Normocephalic.      Nose: No rhinorrhea.   Cardiovascular:      Rate and Rhythm: Normal rate and regular rhythm.      Heart sounds: Normal heart sounds.   Pulmonary:      Effort: Pulmonary effort is normal.      Breath sounds: Normal breath sounds.   Abdominal:      General: There is no distension.   Musculoskeletal:         General: No swelling. Normal range of motion.   Skin:     General: Skin is warm and dry.      Nails: There is no clubbing.   Neurological:      General: No focal deficit present.      Mental Status: She is alert.   Psychiatric:         Mood and Affect: Mood normal.         Behavior: Behavior normal.         Judgment: Judgment normal.           RESULTS/DATA     Pulmonary Function Test Results       Testing:   PFTs:   - 6/19/13: FEV1/FVC 0.84/FEV1 2.01 (86%)/ FVC 2.51 (86%)/ TLC 3.67 (81%)/   - 1/13/20: FEV1/FVC 0.88/ FEV1 2.12 (99%)/ FVC 2.38 (87%)/ TLC 3.58 (80%)/ DLCO 72%  - 6/13/22: FEV1/FVC 0.80/ FEV1 1.95 (93%)/ FVC 2.44 (92%)/ TLC 4.10 (92%)/ DLCO 74%     6MWT  - 6/13/22: 384m (319m LLN) 97% -> 92% on RA. MOLLY: 0    Chest Radiograph     XR chest 2 view " 09/22/2023  Impression  Mild interstitial edema. Enlarged cardiac silhouette. No  consolidation seen      Chest CT Scan     CT chest:   - 6/16/20: stable pulmonary nodules, mosaic attenuation  - 8/13/21 - No new pulmonary nodules. Stable pulmonary nodules as described above. Unchanged mild diffuse mosaic attenuation, consistent with small airway disease. Stable patchy ground-glass opacity with associated traction  bronchiectasis within the left upper lobe, likely infectious/inflammatory in etiology. Unchanged lower lobe predominant diffuse mosaic pattern, consistent with small airspace/small vessel disease. Status post thyroidectomy. No evidence of tumor recurrence within the thyroid bed. No evidence of thoracic lymphadenopathy.  -10 2022 stable micronodules. Mosaic attenuation but also groundglass opacities in the anterior upper lobes mainly on the left stable compared to prior.  - 8/22/23 -  No definite evidence of metastatic disease in the chest. Few  bilateral noncalcified pulmonary nodules measuring up to 6 mm are  stable since multiple prior examinations, favoring benign etiology.  2. Additional stable chronic findings as above.         Echocardiogram     Echo: 10/2/15: EF normal       ASSESSMENT/PLAN     63 year old AAF (current smoker- 30 pack years ) here for follow up for PHILLIPS and pulmonary nodules. Last seen in clinic on 4/28/23.     1. Dyspnea on exertion: has been going on for 8+ years since back surgery. Emphysema on CT as well as history of NICM, but likely also a component of deconditioning as well. PFTs in 1/2020 without obstruction. Improvement in symptoms and has only been using spiriva and proair PRN. CT chest with some mosaic attenuation - PFTs 2021  stable from 2020. 6MWT WNL -> no need for oxygen.  - continue proair 2 puffs every 4-6 hours as needed for shortness of breath     2. Allergic rhinitis: PRN cetirizine and flonase - has been using the flonase intermittently at night   - continue  fluticasone (flonase) 1 spray each nostril 1-2 x per day - remember to aim towards your ear   - continue nasal saline over the counter - use 2-3 x per day to rinse out nasal passages and keep them moist   - continue cetirizine (zyrtec) 10mg daily as needed for allergy symptoms     3. Pulmonary nodules : Hx of thyroid cancer s/p thyroidectomy in 2012 - Nodules stable since 11/2019 and even some from 2014   - continue annual CT scan through endocrinology - when no longer on surveillance will qualify for lung cancer screening.     4. Smoking cessation: smoked 1 PPD x 20- 25 years years and then about 6 cigarettes per day the rest of the time ~ 30 pack years. Not interested in quitting at this time. smokes 1/2 ppd   - > 5 minutes smoking cessation counseling    5. Pulmonary edema: seen on CXR. Seen by cardiology back in 2019. Minimal PHILLIPS today in clinic.   - pt states she feels well and does not want a referral back to see cardiology     Thank you for visiting the Pulmonary clinic today!   Return to clinic 6 months with breathing tests or sooner if needed   Berta Valdez CNP  My office number is [phone] - Cammy is my  and So is my nurse.   [phone] - scheduling

## 2023-10-05 NOTE — TELEPHONE ENCOUNTER
Rx Refill Request Telephone Encounter    Name:  Katherine Sin  :  [id number]  Medication Name:   gabapentin 300 mg capsule            Specific Pharmacy location:    Date of last appointment:    Date of next appointment:  10/20/23  Best number to reach patient:  [phone]

## 2023-10-09 ENCOUNTER — PHARMACY VISIT (OUTPATIENT)
Dept: PHARMACY | Facility: CLINIC | Age: 63
End: 2023-10-09
Payer: MEDICAID

## 2023-10-09 PROCEDURE — RXMED WILLOW AMBULATORY MEDICATION CHARGE

## 2023-10-16 ENCOUNTER — PHARMACY VISIT (OUTPATIENT)
Dept: PHARMACY | Facility: CLINIC | Age: 63
End: 2023-10-16
Payer: MEDICAID

## 2023-10-16 PROCEDURE — RXMED WILLOW AMBULATORY MEDICATION CHARGE

## 2023-10-20 ENCOUNTER — OFFICE VISIT (OUTPATIENT)
Dept: PRIMARY CARE | Facility: CLINIC | Age: 63
End: 2023-10-20
Payer: MEDICAID

## 2023-10-20 ENCOUNTER — PHARMACY VISIT (OUTPATIENT)
Dept: PHARMACY | Facility: CLINIC | Age: 63
End: 2023-10-20
Payer: MEDICAID

## 2023-10-20 VITALS
BODY MASS INDEX: 30.12 KG/M2 | WEIGHT: 178.2 LBS | HEART RATE: 52 BPM | DIASTOLIC BLOOD PRESSURE: 81 MMHG | TEMPERATURE: 97.7 F | SYSTOLIC BLOOD PRESSURE: 126 MMHG | OXYGEN SATURATION: 97 %

## 2023-10-20 DIAGNOSIS — I10 PRIMARY HYPERTENSION: Primary | ICD-10-CM

## 2023-10-20 DIAGNOSIS — Z13.820 SCREENING FOR OSTEOPOROSIS: ICD-10-CM

## 2023-10-20 DIAGNOSIS — Z12.4 SCREENING FOR MALIGNANT NEOPLASM OF CERVIX: ICD-10-CM

## 2023-10-20 DIAGNOSIS — Z23 IMMUNIZATION DUE: ICD-10-CM

## 2023-10-20 PROBLEM — I73.9 PERIPHERAL VASCULAR DISEASE (CMS-HCC): Status: ACTIVE | Noted: 2023-10-20

## 2023-10-20 PROBLEM — K64.9 HEMORRHOIDS: Status: ACTIVE | Noted: 2023-10-20

## 2023-10-20 PROBLEM — Q89.7: Status: ACTIVE | Noted: 2023-10-20

## 2023-10-20 PROBLEM — M48.061 LUMBAR CANAL STENOSIS: Status: RESOLVED | Noted: 2023-10-20 | Resolved: 2023-10-20

## 2023-10-20 PROBLEM — F10.10 ALCOHOL CONSUMPTION BINGE DRINKING: Status: ACTIVE | Noted: 2023-10-20

## 2023-10-20 PROBLEM — I50.30 HEART FAILURE WITH PRESERVED LEFT VENTRICULAR FUNCTION (MULTI): Status: ACTIVE | Noted: 2023-10-20

## 2023-10-20 PROBLEM — E55.9 VITAMIN D INSUFFICIENCY: Status: ACTIVE | Noted: 2023-10-20

## 2023-10-20 PROBLEM — J30.2 SEASONAL ALLERGIC RHINITIS: Status: ACTIVE | Noted: 2023-10-20

## 2023-10-20 PROBLEM — E66.9 OBESITY, CLASS I, BMI 30-34.9: Status: ACTIVE | Noted: 2023-10-20

## 2023-10-20 PROBLEM — F17.200 NICOTINE DEPENDENCE: Status: ACTIVE | Noted: 2023-10-20

## 2023-10-20 PROBLEM — L50.8 CHRONIC URTICARIA: Status: ACTIVE | Noted: 2023-10-20

## 2023-10-20 PROBLEM — E11.9 ENCOUNTER FOR DIABETIC FOOT EXAM (MULTI): Status: RESOLVED | Noted: 2023-06-13 | Resolved: 2023-10-20

## 2023-10-20 PROBLEM — R87.610 PAP SMEAR ABNORMALITY OF CERVIX WITH ASCUS FAVORING BENIGN: Status: ACTIVE | Noted: 2023-10-20

## 2023-10-20 PROBLEM — D64.9 ANEMIA: Status: ACTIVE | Noted: 2023-10-20

## 2023-10-20 PROBLEM — M48.061 LUMBAR CANAL STENOSIS: Status: ACTIVE | Noted: 2023-10-20

## 2023-10-20 PROBLEM — R06.09 DYSPNEA ON EXERTION: Status: RESOLVED | Noted: 2023-10-20 | Resolved: 2023-10-20

## 2023-10-20 PROBLEM — R06.09 DYSPNEA ON EXERTION: Status: ACTIVE | Noted: 2023-10-20

## 2023-10-20 PROBLEM — H26.9 CATARACT OF BOTH EYES: Status: ACTIVE | Noted: 2023-10-20

## 2023-10-20 PROBLEM — M51.369 LUMBAR DEGENERATIVE DISC DISEASE: Status: ACTIVE | Noted: 2023-10-20

## 2023-10-20 PROBLEM — J38.3 VOCAL CORD DYSFUNCTION: Status: ACTIVE | Noted: 2023-10-20

## 2023-10-20 PROBLEM — H02.889 MGD (MEIBOMIAN GLAND DYSFUNCTION): Status: ACTIVE | Noted: 2023-10-20

## 2023-10-20 PROBLEM — K59.00 CONSTIPATION: Status: RESOLVED | Noted: 2023-06-13 | Resolved: 2023-10-20

## 2023-10-20 PROBLEM — R00.1 BRADYCARDIA: Status: ACTIVE | Noted: 2023-10-20

## 2023-10-20 PROBLEM — E11.3219: Status: ACTIVE | Noted: 2023-10-20

## 2023-10-20 PROBLEM — F40.241 PHOBIA TO HEIGHTS: Status: ACTIVE | Noted: 2023-10-20

## 2023-10-20 PROBLEM — K76.89 LIVER CYST: Status: ACTIVE | Noted: 2023-10-20

## 2023-10-20 PROBLEM — M51.36 LUMBAR DEGENERATIVE DISC DISEASE: Status: ACTIVE | Noted: 2023-10-20

## 2023-10-20 PROBLEM — R91.8 LUNG NODULES: Status: ACTIVE | Noted: 2023-10-20

## 2023-10-20 PROBLEM — E66.811 OBESITY, CLASS I, BMI 30-34.9: Status: ACTIVE | Noted: 2023-10-20

## 2023-10-20 PROCEDURE — 3044F HG A1C LEVEL LT 7.0%: CPT | Performed by: STUDENT IN AN ORGANIZED HEALTH CARE EDUCATION/TRAINING PROGRAM

## 2023-10-20 PROCEDURE — 90662 IIV NO PRSV INCREASED AG IM: CPT | Performed by: STUDENT IN AN ORGANIZED HEALTH CARE EDUCATION/TRAINING PROGRAM

## 2023-10-20 PROCEDURE — 90471 IMMUNIZATION ADMIN: CPT | Performed by: STUDENT IN AN ORGANIZED HEALTH CARE EDUCATION/TRAINING PROGRAM

## 2023-10-20 PROCEDURE — 99213 OFFICE O/P EST LOW 20 MIN: CPT | Performed by: STUDENT IN AN ORGANIZED HEALTH CARE EDUCATION/TRAINING PROGRAM

## 2023-10-20 PROCEDURE — RXMED WILLOW AMBULATORY MEDICATION CHARGE

## 2023-10-20 PROCEDURE — 1160F RVW MEDS BY RX/DR IN RCRD: CPT | Performed by: STUDENT IN AN ORGANIZED HEALTH CARE EDUCATION/TRAINING PROGRAM

## 2023-10-20 PROCEDURE — 87624 HPV HI-RISK TYP POOLED RSLT: CPT

## 2023-10-20 PROCEDURE — 3074F SYST BP LT 130 MM HG: CPT | Performed by: STUDENT IN AN ORGANIZED HEALTH CARE EDUCATION/TRAINING PROGRAM

## 2023-10-20 PROCEDURE — 1126F AMNT PAIN NOTED NONE PRSNT: CPT | Performed by: STUDENT IN AN ORGANIZED HEALTH CARE EDUCATION/TRAINING PROGRAM

## 2023-10-20 PROCEDURE — 88175 CYTOPATH C/V AUTO FLUID REDO: CPT

## 2023-10-20 PROCEDURE — 1159F MED LIST DOCD IN RCRD: CPT | Performed by: STUDENT IN AN ORGANIZED HEALTH CARE EDUCATION/TRAINING PROGRAM

## 2023-10-20 PROCEDURE — 4010F ACE/ARB THERAPY RXD/TAKEN: CPT | Performed by: STUDENT IN AN ORGANIZED HEALTH CARE EDUCATION/TRAINING PROGRAM

## 2023-10-20 PROCEDURE — 3079F DIAST BP 80-89 MM HG: CPT | Performed by: STUDENT IN AN ORGANIZED HEALTH CARE EDUCATION/TRAINING PROGRAM

## 2023-10-20 RX ORDER — AMLODIPINE BESYLATE 10 MG/1
10 TABLET ORAL DAILY
Qty: 90 TABLET | Refills: 3 | Status: SHIPPED | OUTPATIENT
Start: 2023-10-20 | End: 2024-10-19

## 2023-10-20 ASSESSMENT — ENCOUNTER SYMPTOMS
LOSS OF SENSATION IN FEET: 0
DEPRESSION: 0
OCCASIONAL FEELINGS OF UNSTEADINESS: 0

## 2023-10-20 NOTE — PROGRESS NOTES
follSubjective   Patient ID: Katherine is a 63 y.o. female with PMH of T2DM, HTN, PVD, HFpEF (EF 64% 2015), lumbar back pain, follicular carcinoma of thyroid, GERD, hemorrhoids, and smoking who presents for New Patient Visit (Est care).    # Smoking  - current, 0.5ppd x50 years, precontemplative, does not want to talk about it today    Sexual History:  - Sexually active? No   - Gender of historical partner(s): male  - Number of partners in the past 6mo: 0  - H/o STIs? Yes, describe: gonorrhea (when teens), denies h/o syphilis      12 system ROS completed, negative except as noted above.    Current Outpatient Medications on File Prior to Visit   Medication Sig Dispense Refill    albuterol 90 mcg/actuation inhaler Inhale.      atorvastatin (Lipitor) 20 mg tablet TAKE 1 TABLET (20 MG) BY MOUTH ONCE DAILY. 90 tablet 2    benzalkonium chloride 0.13 % towelette Please check blood pressure daily and documen numbers into a journal. Please bring journal into clinic whenever you see your doctor.      bisacodyl (Dulcolax) 5 mg EC tablet TAKE 1 TABLET BY MOUTH ONCE DAILY AS NEEDED FOR CONSTIPATION 30 tablet 3    calcium carbonate 600 mg calcium (1,500 mg) tablet TAKE 1 TABLET BY MOUTH TWO TIMES A DAY WITH MEALS 60 tablet 6    calcium carbonate-vit D3-min 600 mg calcium- 400 unit tablet Take 1 tablet by mouth once daily.      cetirizine (ZyrTEC) 10 mg tablet Take 1 tablet (10 mg) by mouth once daily.      cholecalciferol (Vitamin D-3) 25 MCG (1000 UT) capsule TAKE 1 CAPSULE BY MOUTH DAILY 30 capsule 8    docusate sodium (Colace) 100 mg capsule Take 1 capsule (100 mg) by mouth once daily.      fluticasone (Flonase) 50 mcg/actuation nasal spray Administer into affected nostril(s).      furosemide (Lasix) 40 mg tablet Take 1 tablet (40 mg) by mouth 2 times a day.      gabapentin (Neurontin) 300 mg capsule Take 1 capsule (300 mg) by mouth once daily at bedtime. 30 capsule 0    hydroxyurea (Hydrea) 500 mg capsule 3 Tablet       hydrOXYzine HCL (Atarax) 10 mg tablet       ipratropium-albuteroL (Duo-Neb) 0.5-2.5 mg/3 mL nebulizer solution Inhale 3 mL twice a day.      ketorolac (Acular) 0.5 % ophthalmic solution INSTILL 1 DROP FOUR TIMES A DAY INTO THE RIGHT EYE 5 mL 2    levothyroxine (Synthroid, Levoxyl) 100 mcg tablet TAKE 1 TABLET DAILY AS DIRECTED. 90 tablet 2    lisinopril 40 mg tablet TAKE 1 TABLET BY MOUTH ONCE DAILY AS DIRECTED 90 tablet 3    metFORMIN (Glucophage) 1,000 mg tablet TAKE 1 TABLET BY MOUTH TWO TIMES A DAY WITH MEALS 180 tablet 3    prednisoLONE acetate (Pred-Forte) 1 % ophthalmic suspension       [DISCONTINUED] amLODIPine (Norvasc) 10 mg tablet Take 1 tablet (10 mg) by mouth once daily.      [DISCONTINUED] cholecalciferol (Vitamin D-3) 25 MCG (1000 UT) capsule Take by mouth.      [DISCONTINUED] cyclobenzaprine (Flexeril) 10 mg tablet Take by mouth.      [DISCONTINUED] levothyroxine (Synthroid, Levoxyl) 112 mcg tablet Take 1 tablet (112 mcg) by mouth once daily.      [DISCONTINUED] levothyroxine (Synthroid, Levoxyl) 112 mcg tablet TAKE ONE TABLET BY MOUTH EVERY DAY 90 tablet 3    [DISCONTINUED] prednisoLONE acetate (Pred-Forte) 1 % ophthalmic suspension INSTILL 1 DROP INTO AFFECTED EYE(S) FOUR TIMES A DAY 10 mL 1    [DISCONTINUED] cyclobenzaprine (Flexeril) 10 mg tablet TAKE ONE TABLET BY MOUTH AT BEDTIME AS DIRECTED 2700 tablet 1     No current facility-administered medications on file prior to visit.        Objective   Vitals: /81 (BP Location: Right arm, Patient Position: Sitting, BP Cuff Size: Adult)   Pulse 52   Temp 36.5 °C (97.7 °F) (Temporal)   Wt 80.8 kg (178 lb 3.2 oz)   SpO2 97%   BMI 30.12 kg/m²      Physical Exam  Vitals reviewed.   Constitutional:       General: She is not in acute distress.     Appearance: Normal appearance. She is not ill-appearing.   HENT:      Head: Normocephalic and atraumatic.   Eyes:      Extraocular Movements: Extraocular movements intact.      Conjunctiva/sclera:  Conjunctivae normal.   Cardiovascular:      Rate and Rhythm: Normal rate and regular rhythm.      Heart sounds: No murmur heard.     No friction rub. No gallop.   Pulmonary:      Effort: Pulmonary effort is normal. No respiratory distress.      Breath sounds: Normal breath sounds. No wheezing, rhonchi or rales.   Abdominal:      General: Abdomen is flat. There is no distension.      Palpations: Abdomen is soft.      Tenderness: There is no abdominal tenderness.   Genitourinary:     Exam position: Lithotomy position.      Pubic Area: No rash.       Vagina: Normal.      Cervix: Friability (mild) present.      Comments: Pap collected  Musculoskeletal:         General: No tenderness or signs of injury. Normal range of motion.      Cervical back: Normal range of motion. No tenderness.      Right lower leg: No edema.      Left lower leg: No edema.   Skin:     General: Skin is warm and dry.   Neurological:      General: No focal deficit present.      Mental Status: She is alert and oriented to person, place, and time.      Cranial Nerves: No cranial nerve deficit.      Motor: No weakness.      Gait: Gait normal.   Psychiatric:         Mood and Affect: Mood normal.         Behavior: Behavior normal.       Assessment/Plan   Problem List Items Addressed This Visit       Hypertension - Primary     At goal today (<130/80). Renewed medication at patient's request.         Relevant Medications    amLODIPine (Norvasc) 10 mg tablet     Other Visit Diagnoses       Immunization due        Relevant Orders    Flu vaccine, quadrivalent, high-dose, preservative free, age 65y+ (FLUZONE) (Completed)    Screening for malignant neoplasm of cervix        pap collected today    Relevant Orders    THINPREP PAP TEST    Screening for osteoporosis        has never had a DEXA, assents to screening    Relevant Orders    XR DEXA bone density            Patient Attending Supervision: discussed with attending physician (marizol listed on this  note).    RTC in 3 months, or earlier as needed.    Sierra Zavala MD  Family Medicine PGY-3  [facility]

## 2023-10-20 NOTE — PATIENT INSTRUCTIONS
Thank you for coming in to see us today, Ms. Katherine Sin! It was a pleasure managing your health together.    Today in clinic, we discussed your blood pressure medication. I renewed your amlodipine, keep up the great work with your blood pressure!    We gave you a flu shot today. We performed a pap smear, which is a test for cervical cancer; it can take more than a week for this result to come back. If it's normal, you never need another one again! You're also due for a DEXA scan (an xray to screen for bone thinning, or osteoporosis); please call [phone] to schedule this.    If we placed a referral today for a specialist/procedure and you did not otherwise receive a phone number to schedule, please call the general scheduling line at [phone]. You may also schedule appointments on the [employer] obey.    I'm looking forward to seeing you back in clinic in 4 months to discuss your general health.    Best,  Dr. Zavala

## 2023-10-23 NOTE — PROGRESS NOTES
I reviewed the resident/fellow's documentation and discussed the patient with the resident/fellow. I agree with the resident/fellow's medical decision making as documented in the note.     Gildardo Delgado MD

## 2023-10-24 ENCOUNTER — ANESTHESIA EVENT (OUTPATIENT)
Dept: OPERATING ROOM | Facility: CLINIC | Age: 63
End: 2023-10-24
Payer: MEDICAID

## 2023-10-24 ENCOUNTER — PREP FOR PROCEDURE (OUTPATIENT)
Dept: PREOP | Facility: CLINIC | Age: 63
End: 2023-10-24
Payer: MEDICAID

## 2023-10-24 RX ORDER — MOXIFLOXACIN 5 MG/ML
1 SOLUTION/ DROPS OPHTHALMIC ONCE
OUTPATIENT
Start: 2023-10-24 | End: 2023-10-24

## 2023-10-24 RX ORDER — TETRACAINE HYDROCHLORIDE 5 MG/ML
1 SOLUTION OPHTHALMIC ONCE
OUTPATIENT
Start: 2023-10-24 | End: 2023-10-24

## 2023-10-24 RX ORDER — PHENYLEPHRINE HYDROCHLORIDE 25 MG/ML
1 SOLUTION/ DROPS OPHTHALMIC
OUTPATIENT
Start: 2023-10-24 | End: 2023-10-24

## 2023-10-24 RX ORDER — TROPICAMIDE 10 MG/ML
1 SOLUTION/ DROPS OPHTHALMIC
OUTPATIENT
Start: 2023-10-24 | End: 2023-10-24

## 2023-10-24 RX ORDER — CYCLOPENTOLATE HYDROCHLORIDE 10 MG/ML
1 SOLUTION/ DROPS OPHTHALMIC
OUTPATIENT
Start: 2023-10-24 | End: 2023-10-24

## 2023-10-25 ENCOUNTER — ANESTHESIA (OUTPATIENT)
Dept: OPERATING ROOM | Facility: CLINIC | Age: 63
End: 2023-10-25
Payer: MEDICAID

## 2023-10-25 ENCOUNTER — HOSPITAL ENCOUNTER (OUTPATIENT)
Facility: CLINIC | Age: 63
Setting detail: OUTPATIENT SURGERY
Discharge: HOME | End: 2023-10-25
Attending: OPHTHALMOLOGY | Admitting: OPHTHALMOLOGY
Payer: MEDICAID

## 2023-10-25 VITALS
HEART RATE: 63 BPM | RESPIRATION RATE: 16 BRPM | WEIGHT: 177.91 LBS | HEIGHT: 65 IN | TEMPERATURE: 97.2 F | OXYGEN SATURATION: 94 % | DIASTOLIC BLOOD PRESSURE: 80 MMHG | SYSTOLIC BLOOD PRESSURE: 138 MMHG | BODY MASS INDEX: 29.64 KG/M2

## 2023-10-25 DIAGNOSIS — H25.812 COMBINED FORMS OF AGE-RELATED CATARACT OF LEFT EYE: Primary | ICD-10-CM

## 2023-10-25 LAB — GLUCOSE BLD MANUAL STRIP-MCNC: 134 MG/DL (ref 74–99)

## 2023-10-25 PROCEDURE — 2780000003 HC OR 278 NO HCPCS: Performed by: OPHTHALMOLOGY

## 2023-10-25 PROCEDURE — 3700000001 HC GENERAL ANESTHESIA TIME - INITIAL BASE CHARGE: Performed by: OPHTHALMOLOGY

## 2023-10-25 PROCEDURE — 7100000010 HC PHASE TWO TIME - EACH INCREMENTAL 1 MINUTE: Performed by: OPHTHALMOLOGY

## 2023-10-25 PROCEDURE — V2632 POST CHMBR INTRAOCULAR LENS: HCPCS | Performed by: OPHTHALMOLOGY

## 2023-10-25 PROCEDURE — 2720000007 HC OR 272 NO HCPCS: Performed by: OPHTHALMOLOGY

## 2023-10-25 PROCEDURE — A66984 PR REMV CATARACT EXTRACAP,INSERT LENS: Performed by: STUDENT IN AN ORGANIZED HEALTH CARE EDUCATION/TRAINING PROGRAM

## 2023-10-25 PROCEDURE — 3700000002 HC GENERAL ANESTHESIA TIME - EACH INCREMENTAL 1 MINUTE: Performed by: OPHTHALMOLOGY

## 2023-10-25 PROCEDURE — 2500000004 HC RX 250 GENERAL PHARMACY W/ HCPCS (ALT 636 FOR OP/ED): Mod: SE | Performed by: NURSE ANESTHETIST, CERTIFIED REGISTERED

## 2023-10-25 PROCEDURE — 82947 ASSAY GLUCOSE BLOOD QUANT: CPT

## 2023-10-25 PROCEDURE — 66984 XCAPSL CTRC RMVL W/O ECP: CPT | Performed by: OPHTHALMOLOGY

## 2023-10-25 PROCEDURE — 2500000001 HC RX 250 WO HCPCS SELF ADMINISTERED DRUGS (ALT 637 FOR MEDICARE OP): Mod: SE | Performed by: OPHTHALMOLOGY

## 2023-10-25 PROCEDURE — 2500000005 HC RX 250 GENERAL PHARMACY W/O HCPCS: Mod: SE | Performed by: OPHTHALMOLOGY

## 2023-10-25 PROCEDURE — 3600000003 HC OR TIME - INITIAL BASE CHARGE - PROCEDURE LEVEL THREE: Performed by: OPHTHALMOLOGY

## 2023-10-25 PROCEDURE — 2500000001 HC RX 250 WO HCPCS SELF ADMINISTERED DRUGS (ALT 637 FOR MEDICARE OP): Mod: SE | Performed by: STUDENT IN AN ORGANIZED HEALTH CARE EDUCATION/TRAINING PROGRAM

## 2023-10-25 PROCEDURE — 7100000009 HC PHASE TWO TIME - INITIAL BASE CHARGE: Performed by: OPHTHALMOLOGY

## 2023-10-25 PROCEDURE — A66984 PR REMV CATARACT EXTRACAP,INSERT LENS: Performed by: NURSE ANESTHETIST, CERTIFIED REGISTERED

## 2023-10-25 PROCEDURE — 3600000008 HC OR TIME - EACH INCREMENTAL 1 MINUTE - PROCEDURE LEVEL THREE: Performed by: OPHTHALMOLOGY

## 2023-10-25 DEVICE — ACRYSOF(R) SOFT ACRYLIC MULTIPIECE STERILEPCL(IOL/PC), 13.0MM LENGTH, 6.0MM MENISCUSOPTIC, 5-DEGREE MONOFLEX(TM*) HAPTICS.                       *REG. U.S. PAT. & TM OFF.
Type: IMPLANTABLE DEVICE | Site: EYE | Status: FUNCTIONAL
Brand: ACRYSOF®MONOFLEX®

## 2023-10-25 RX ORDER — TETRACAINE HYDROCHLORIDE 5 MG/ML
1 SOLUTION OPHTHALMIC ONCE
Status: COMPLETED | OUTPATIENT
Start: 2023-10-25 | End: 2023-10-25

## 2023-10-25 RX ORDER — SODIUM CHLORIDE, SODIUM LACTATE, POTASSIUM CHLORIDE, CALCIUM CHLORIDE 600; 310; 30; 20 MG/100ML; MG/100ML; MG/100ML; MG/100ML
100 INJECTION, SOLUTION INTRAVENOUS CONTINUOUS
Status: DISCONTINUED | OUTPATIENT
Start: 2023-10-25 | End: 2023-10-25 | Stop reason: HOSPADM

## 2023-10-25 RX ORDER — TETRACAINE HYDROCHLORIDE 5 MG/ML
SOLUTION OPHTHALMIC AS NEEDED
Status: DISCONTINUED | OUTPATIENT
Start: 2023-10-25 | End: 2023-10-25 | Stop reason: HOSPADM

## 2023-10-25 RX ORDER — MIDAZOLAM HYDROCHLORIDE 1 MG/ML
INJECTION, SOLUTION INTRAMUSCULAR; INTRAVENOUS AS NEEDED
Status: DISCONTINUED | OUTPATIENT
Start: 2023-10-25 | End: 2023-10-25

## 2023-10-25 RX ORDER — ONDANSETRON HYDROCHLORIDE 2 MG/ML
4 INJECTION, SOLUTION INTRAVENOUS ONCE AS NEEDED
Status: DISCONTINUED | OUTPATIENT
Start: 2023-10-25 | End: 2023-10-25 | Stop reason: HOSPADM

## 2023-10-25 RX ORDER — CYCLOPENTOLATE HYDROCHLORIDE 10 MG/ML
1 SOLUTION/ DROPS OPHTHALMIC
Status: COMPLETED | OUTPATIENT
Start: 2023-10-25 | End: 2023-10-25

## 2023-10-25 RX ORDER — MOXIFLOXACIN 5 MG/ML
1 SOLUTION/ DROPS OPHTHALMIC ONCE
Status: COMPLETED | OUTPATIENT
Start: 2023-10-25 | End: 2023-10-25

## 2023-10-25 RX ORDER — PHENYLEPHRINE HYDROCHLORIDE 25 MG/ML
1 SOLUTION/ DROPS OPHTHALMIC
Status: COMPLETED | OUTPATIENT
Start: 2023-10-25 | End: 2023-10-25

## 2023-10-25 RX ORDER — FENTANYL CITRATE 50 UG/ML
INJECTION, SOLUTION INTRAMUSCULAR; INTRAVENOUS AS NEEDED
Status: DISCONTINUED | OUTPATIENT
Start: 2023-10-25 | End: 2023-10-25

## 2023-10-25 RX ORDER — PROPOFOL 10 MG/ML
INJECTION, EMULSION INTRAVENOUS AS NEEDED
Status: DISCONTINUED | OUTPATIENT
Start: 2023-10-25 | End: 2023-10-25

## 2023-10-25 RX ORDER — TROPICAMIDE 10 MG/ML
1 SOLUTION/ DROPS OPHTHALMIC
Status: COMPLETED | OUTPATIENT
Start: 2023-10-25 | End: 2023-10-25

## 2023-10-25 RX ADMIN — TETRACAINE HYDROCHLORIDE 1 DROP: 5 SOLUTION/ DROPS OPHTHALMIC at 07:15

## 2023-10-25 RX ADMIN — CYCLOPENTOLATE HYDROCHLORIDE 1 DROP: 10 SOLUTION/ DROPS OPHTHALMIC at 07:10

## 2023-10-25 RX ADMIN — CYCLOPENTOLATE HYDROCHLORIDE 1 DROP: 10 SOLUTION/ DROPS OPHTHALMIC at 07:05

## 2023-10-25 RX ADMIN — FENTANYL CITRATE 50 MCG: 50 INJECTION, SOLUTION INTRAMUSCULAR; INTRAVENOUS at 07:46

## 2023-10-25 RX ADMIN — PHENYLEPHRINE HYDROCHLORIDE 1 DROP: 25 SOLUTION/ DROPS OPHTHALMIC at 07:10

## 2023-10-25 RX ADMIN — PHENYLEPHRINE HYDROCHLORIDE 1 DROP: 25 SOLUTION/ DROPS OPHTHALMIC at 07:15

## 2023-10-25 RX ADMIN — MIDAZOLAM 2 MG: 1 INJECTION INTRAMUSCULAR; INTRAVENOUS at 07:56

## 2023-10-25 RX ADMIN — PROPOFOL 40 MG: 10 INJECTION, EMULSION INTRAVENOUS at 07:51

## 2023-10-25 RX ADMIN — PHENYLEPHRINE HYDROCHLORIDE 1 DROP: 25 SOLUTION/ DROPS OPHTHALMIC at 07:05

## 2023-10-25 RX ADMIN — TROPICAMIDE 1 DROP: 10 SOLUTION/ DROPS OPHTHALMIC at 07:15

## 2023-10-25 RX ADMIN — TROPICAMIDE 1 DROP: 10 SOLUTION/ DROPS OPHTHALMIC at 07:05

## 2023-10-25 RX ADMIN — TROPICAMIDE 1 DROP: 10 SOLUTION/ DROPS OPHTHALMIC at 07:10

## 2023-10-25 RX ADMIN — MOXIFLOXACIN OPHTHALMIC SOLUTION 1 DROP: 5 SOLUTION/ DROPS OPHTHALMIC at 07:05

## 2023-10-25 RX ADMIN — CYCLOPENTOLATE HYDROCHLORIDE 1 DROP: 10 SOLUTION/ DROPS OPHTHALMIC at 07:15

## 2023-10-25 RX ADMIN — MIDAZOLAM 2 MG: 1 INJECTION INTRAMUSCULAR; INTRAVENOUS at 07:46

## 2023-10-25 ASSESSMENT — COLUMBIA-SUICIDE SEVERITY RATING SCALE - C-SSRS
6. HAVE YOU EVER DONE ANYTHING, STARTED TO DO ANYTHING, OR PREPARED TO DO ANYTHING TO END YOUR LIFE?: NO
1. IN THE PAST MONTH, HAVE YOU WISHED YOU WERE DEAD OR WISHED YOU COULD GO TO SLEEP AND NOT WAKE UP?: NO
2. HAVE YOU ACTUALLY HAD ANY THOUGHTS OF KILLING YOURSELF?: NO

## 2023-10-25 ASSESSMENT — PAIN - FUNCTIONAL ASSESSMENT
PAIN_FUNCTIONAL_ASSESSMENT: 0-10

## 2023-10-25 ASSESSMENT — PAIN SCALES - GENERAL
PAINLEVEL_OUTOF10: 0 - NO PAIN

## 2023-10-25 NOTE — OP NOTE
PHACO PCIOL OS (L) Operative Note     Date: 10/25/2023  OR Location: [facility] OR    Name: Katherine Sin, : 1960, Age: 63 y.o., MRN: [id number], Sex: female    Diagnosis  Pre-op Diagnosis     * Combined form of age-related cataract, left eye [H25.812] Post-op Diagnosis     * Combined forms of age-related cataract of left eye [H25.812]     Procedures    * PHACO PCIOL OS    Surgeons      * Dior Cao - Primary    Resident/Fellow/Other Assistant:  Surgeon(s) and Role:    Procedure Summary  Anesthesia: Monitor Anesthesia Care  ASA: III  Anesthesia Staff: Anesthesiologist: Sierra Harry MD  CRNA: DORIS Urban-CRNA  Estimated Blood Loss: 0mL  Intra-op Medications:   Medication Name Total Dose   chondroitin sulf-sod hyaluron (Duovisc) intraocular kit 0.5 mL   balanced salts (BSS) intraocular solution 15 mL   balanced salts (BSS) intraocular solution 500 mL   sodium hyaluronate (Healon) intraocular injection 1 mg   tetracaine (PF) 0.5 % ophthalmic solution 1 drop   Retrobulbar Block #3 ([facility]) 6 mL              Anesthesia Record               Intraprocedure I/O Totals       None           Specimen: No specimens collected     Staff:   Circulator: Jesus Spence RN  Scrub Person: Jackie Esquivel         Drains and/or Catheters: * None in log *    Tourniquet Times:         Implants:  Implants       Type Name Action Serial No.       4.0  DIOPTER, ACRYSOF MULTI-PIECE IOL, MODEL [id number], 6.0MM OPTIC, 13.0MM LENGTH, MENISCUS, FOLDABLE Implanted [id number]              Findings: Cataract OS    Indications: Katherine Sin is an 63 y.o. female who is having surgery for cataract left eye.    The patient was seen in the preoperative area. The risks, benefits, complications, treatment options, non-operative alternatives, expected recovery and outcomes were discussed with the patient. The possibilities of reaction to medication, pulmonary aspiration, injury to surrounding structures, bleeding, recurrent  infection, the need for additional procedures, failure to diagnose a condition, and creating a complication requiring transfusion or operation were discussed with the patient. The patient concurred with the proposed plan, giving informed consent.  The site of surgery was properly noted/marked if necessary per policy. The patient has been actively warmed in preoperative area. Preoperative antibiotics have been ordered and given within 1 hours of incision. Venous thrombosis prophylaxis are not indicated.    Procedure Details: The patient was placed in the supine position on the operating room table where appropriate blood pressure and cardiac monitoring were initiated. The local anesthetic used was a 1:1 mixture of 2% Xylocaine and 0.5% bupivacaine. 5 mL were used to perform a retrobulbar block of the operative eye. The patient was prepped and draped in the usual sterile fashion for intraocular surgery. This included instillation of Betadine 5% onto the ocular surface followed by irrigation with balance salt solution a minute or two later. A lid speculum was placed and the operating microscope was positioned. One paracentesis stab incision was made to the left of the planned cataract incision with a 15-degree supersharp blade. 1 ml of preservative free lidocaine was injected into the AC. Viscoat was used to replace the aqueous humor. A temporal clear corneal wound was fashioned beginning at the limbus with a 2.2 mm keratome, extending 2 mm into clear cornea before entering the anterior chamber. A continuous tear circular capsulorhexis of approximately 5 mm in diameter was performed. Hydrodissection was performed using a Urias canula. The endothelium was coated with viscoat again. Using the Ozil handpiece on the TruVitals Lens Removal System, the nucleus was emulsified and aspirated using a divide-and-conquer technique. Residual cortex was removed from the eye with the irrigation/aspiration bimanually. ProVisc was used  to inflate the capsular bag. The main incision was enlarged to about 2.6mm. The lens implant was inspected and found to be free of visible defects. The lens used was an Hugo model MA60MA, power 4.0 diopter intraocular lens. The lens was inserted into the capsular bag using the [place] insertion mechanism. The lens was centered with a [place]on hook. Residual viscoelastic was removed from the eye with the irrigation/aspiration instrument. Preservative free moxifloxacin was injected  intracameral. The wounds were checked and found to be watertight. 0.3ml of Diluted (1:3) triamcinolone acetonide was injected subonj inferiorly. The lid speculum was removed and the eye was dressed with Maxitrol ointment, eye pad, tape, and shield. The patient tolerated the procedure well, and there were no complications.   Complications:  None; patient tolerated the procedure well.    Disposition: PACU - hemodynamically stable.  Condition: stable         Additional Details: None    Attending Attestation: I performed the procedure.    Dior Cao  Phone Number: [phone]

## 2023-10-25 NOTE — H&P
History Of Present Illness  Katherine Sin is a 63 y.o. female presenting with visually significant cataract of the left eye .     Past Medical History  Past Medical History:   Diagnosis Date    Age-related nuclear cataract, left eye 10/12/2016    Age-related nuclear cataract of left eye    Age-related nuclear cataract, right eye 10/12/2016    Age-related nuclear cataract of right eye    Bradycardia     Congestive heart failure (CHF) (CMS/[facility])     COPD (chronic obstructive pulmonary disease) (CMS/[facility])     Diabetes mellitus (CMS/[facility])     Hyperlipidemia     Hypertension     Hypothyroidism     Meibomian gland dysfunction of unspecified eye, unspecified eyelid 05/30/2014    MGD (meibomian gland dysfunction)    Menopausal and female climacteric states 06/11/2015    Menopausal symptoms    Myopia, unspecified eye 05/30/2014    Myopia with presbyopia    Personal history of malignant neoplasm of thyroid 06/12/2015    History of thyroid cancer    Pulmonary nodules     Round hole, left eye 10/31/2016    Round hole of left retina without detachment    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    Supraglottic stenosis       Surgical History  Past Surgical History:   Procedure Laterality Date    CARDIAC CATHETERIZATION  07/23/2013    Cardiac Cath Procedure Outcome:    CATARACT EXTRACTION      COLONOSCOPY  06/24/2013    Complete Colonoscopy    THYROID SURGERY  07/23/2013    Thyroid Surgery        Social History  She reports that she has been smoking cigarettes. She has a 25.00 pack-year smoking history. She has never used smokeless tobacco. She reports current alcohol use. She reports that she does not use drugs.    Family History  Family History   Problem Relation Name Age of Onset    Diabetes Sister      Other (TYPE C VIRAL HEPATITIS) Brother          Allergies  Hydromorphone, Ibuprofen, Topiramate, and Tramadol    Review of Systems   All other systems reviewed and are negative.       Physical Exam  Vitals reviewed.   Constitutional:       " Appearance: Normal appearance.   HENT:      Head: Normocephalic and atraumatic.      Mouth/Throat:      Pharynx: Oropharynx is clear.   Eyes:      Conjunctiva/sclera: Conjunctivae normal.   Abdominal:      General: Abdomen is flat.   Musculoskeletal:         General: Normal range of motion.      Cervical back: Normal range of motion.   Skin:     General: Skin is warm.   Neurological:      General: No focal deficit present.      Mental Status: She is alert and oriented to person, place, and time.   Psychiatric:         Mood and Affect: Mood normal.         Behavior: Behavior normal.          Last Recorded Vitals  Blood pressure 149/82, pulse 58, temperature 36.5 °C (97.7 °F), temperature source Temporal, resp. rate 16, height 1.638 m (5' 4.5\"), weight 80.7 kg (177 lb 14.6 oz), SpO2 99 %.    Relevant Results           Assessment/Plan   Active Problems:  There are no active Hospital Problems.    #Visually significant cataract of the left eye   -Plan for cataract extraction and intraocular lens (IOL) implantation of left eye             Mikhail Medellin MD    "

## 2023-10-25 NOTE — ANESTHESIA POSTPROCEDURE EVALUATION
Patient: Katherine Sin    Procedure Summary       Date: 10/25/23 Room / [place] [facility] / [facility]    Anesthesia Start: 0719 Anesthesia Stop:     Procedure: PHACO PCIOL OS (Left) Diagnosis:       Combined forms of age-related cataract, unspecified eye      (Combined forms of age-related cataract, unspecified eye [H25.819])    Surgeons: Dior Cao MD Responsible Provider: JAIMIE Urban    Anesthesia Type: MAC ASA Status: 3            Anesthesia Type: MAC    Vitals Value Taken Time   /95 10/25/23 0836   Temp 36.3 10/25/23 0836   Pulse 68 10/25/23 0836   Resp 16 10/25/23 0836   SpO2 96 10/25/23 0836       Anesthesia Post Evaluation    Patient location during evaluation: bedside  Patient participation: complete - patient participated  Level of consciousness: awake and alert  Pain management: adequate  Airway patency: patent  Cardiovascular status: acceptable  Respiratory status: acceptable  Hydration status: acceptable        No notable events documented.

## 2023-10-25 NOTE — DISCHARGE INSTRUCTIONS
Start the following eye drops in the operative eye in the morning:     Prednisolone acetate (pink or white cap) - 4 times a day       No heavy lifting over 10-15 lbs, no bending over, do not get eye wet, no eye rubbing. Wear patch and eye shield overnight, then you can remove the eye patch and start eye drops. Follow up at your next appointment tomorrow. Please call immediately if you develop any redness, pain, decreased vision, flashes, floaters.   Office phone (after hours): [phone]       Hospital : [phone] (call this number if unable to reach someone on the phone above) then ask for ophthalmologist on call.     Patient to resume home medications.

## 2023-10-25 NOTE — BRIEF OP NOTE
Date: 10/25/2023  OR Location: [facility] OR    Name: Katherine Sin, : 1960, Age: 63 y.o., MRN: [id number], Sex: female    Diagnosis  Pre-op Diagnosis     * Combined form of age-related cataract, left eye [H25.812] Post-op Diagnosis     * Combined forms of age-related cataract of left eye [H25.812]     Procedures    * PHACO PCIOL OS    Surgeons      * Dior Cao - Primary    Resident/Fellow/Other Assistant:  Surgeon(s) and Role:    Procedure Summary  Anesthesia: Monitor Anesthesia Care  ASA: III  Anesthesia Staff: Anesthesiologist: Sierra Harry MD  CRNA: DORIS Urban-CRNA  Estimated Blood Loss: 0mL  Intra-op Medications:   Medication Name Total Dose   chondroitin sulf-sod hyaluron (Duovisc) intraocular kit 0.5 mL   balanced salts (BSS) intraocular solution 15 mL   balanced salts (BSS) intraocular solution 500 mL   sodium hyaluronate (Healon) intraocular injection 1 mg   tetracaine (PF) 0.5 % ophthalmic solution 1 drop   Retrobulbar Block #3 ([facility] Cases) 6 mL              Anesthesia Record               Intraprocedure I/O Totals       None           Specimen: No specimens collected     Staff:   Circulator: Jesus Spence RN  Scrub Person: Jackie Esquivel          Findings: Cataract OS    Complications:  None; patient tolerated the procedure well.     Disposition: PACU - hemodynamically stable.  Condition: stable  Specimens Collected: No specimens collected  Attending Attestation: I performed the procedure.    Dior Cao  Phone Number: [phone]

## 2023-10-25 NOTE — ANESTHESIA PREPROCEDURE EVALUATION
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 associated with type 2 diabetes mellitus (CMS/HCC)   (+) Diabetes mellitus type 2 in obese (CMS/HCC)   (+) Follicular carcinoma of thyroid gland (CMS/HCC)   (+) Hypothyroidism, postsurgical      GI   (+) Esophageal reflux      /Renal   (+) Liver cyst      Neuro/Psych   (+) Depression      Pulmonary   (+) Emphysema lung (CMS/HCC)   (+) Lung nodules      Hematology   (+) Anemia      Musculoskeletal   (+) Lumbar degenerative disc disease       Clinical information reviewed:   Tobacco  Allergies  Meds   Med Hx  Surg Hx   Fam Hx  Soc Hx        NPO Detail:  NPO/Void Status  Carbonhydrate Drink Given Prior to Surgery? : N  Date of Last Liquid: 10/25/23  Time of Last Liquid: 0400  Date of Last Solid: 10/24/23  Time of Last Solid: 2200  Last Intake Type: Clear fluids  Time of Last Void: 0400         Physical Exam    Airway  Mallampati: III     Cardiovascular   Rhythm: regular  Rate: normal     Dental    Pulmonary   Breath sounds clear to auscultation     Abdominal      Other findings: Edentulous mandible          Anesthesia Plan    ASA 3     MAC     intravenous induction   Anesthetic plan and risks discussed with patient.    Plan discussed with CRNA.

## 2023-10-26 ASSESSMENT — PAIN SCALES - GENERAL: PAINLEVEL_OUTOF10: 0 - NO PAIN

## 2023-10-27 ENCOUNTER — OFFICE VISIT (OUTPATIENT)
Dept: OPHTHALMOLOGY | Facility: CLINIC | Age: 63
End: 2023-10-27
Payer: MEDICAID

## 2023-10-27 DIAGNOSIS — Z96.1 PSEUDOPHAKIA: Primary | ICD-10-CM

## 2023-10-27 PROCEDURE — 99024 POSTOP FOLLOW-UP VISIT: CPT | Performed by: OPHTHALMOLOGY

## 2023-10-27 ASSESSMENT — TONOMETRY
IOP_METHOD: GOLDMANN APPLANATION
OS_IOP_MMHG: 14

## 2023-10-27 ASSESSMENT — SLIT LAMP EXAM - LIDS
COMMENTS: NORMAL
COMMENTS: NORMAL

## 2023-10-27 ASSESSMENT — VISUAL ACUITY
METHOD: SNELLEN - LINEAR
OS_SC: 20/30

## 2023-10-27 ASSESSMENT — EXTERNAL EXAM - LEFT EYE: OS_EXAM: NORMAL

## 2023-10-27 ASSESSMENT — EXTERNAL EXAM - RIGHT EYE: OD_EXAM: NORMAL

## 2023-10-27 NOTE — PATIENT INSTRUCTIONS
Postop instructions:  Prednisolone acetate drops:  4 times/day for 2 weeks  3 times/day for 2 weeks  2 times/day for 2 weeks  Once/day for 2 weeks      Sleep with shield at night for 7 days  No eye rubbing  May shower and wash face but no water inside surgery eye  No lifting any weight above 10lbs

## 2023-11-03 ENCOUNTER — OFFICE VISIT (OUTPATIENT)
Dept: OPHTHALMOLOGY | Facility: CLINIC | Age: 63
End: 2023-11-03
Payer: MEDICAID

## 2023-11-03 DIAGNOSIS — Z96.1 PSEUDOPHAKIA: Primary | ICD-10-CM

## 2023-11-03 PROCEDURE — 99024 POSTOP FOLLOW-UP VISIT: CPT | Performed by: OPHTHALMOLOGY

## 2023-11-03 ASSESSMENT — VISUAL ACUITY
OD_SC: 20/30
METHOD: SNELLEN - LINEAR
OS_SC: 20/30-2

## 2023-11-03 ASSESSMENT — REFRACTION_MANIFEST
OD_SPHERE: +1.50
OD_AXIS: 115
OD_CYLINDER: -0.75
OS_CYLINDER: -1.50
OS_AXIS: 075
METHOD_AUTOREFRACTION: 1
OS_SPHERE: +2.00

## 2023-11-03 ASSESSMENT — SLIT LAMP EXAM - LIDS
COMMENTS: NORMAL
COMMENTS: NORMAL

## 2023-11-03 ASSESSMENT — EXTERNAL EXAM - RIGHT EYE: OD_EXAM: NORMAL

## 2023-11-03 ASSESSMENT — EXTERNAL EXAM - LEFT EYE: OS_EXAM: NORMAL

## 2023-11-03 ASSESSMENT — ENCOUNTER SYMPTOMS: EYES NEGATIVE: 1

## 2023-11-03 NOTE — PROGRESS NOTES
POW 1 s/p phaco OS  PO gtts taper as instructed  ER precautions   PO instructions  F/u 1 month for autoref and mrx

## 2023-11-04 LAB
CYTOLOGY CMNT CVX/VAG CYTO-IMP: NORMAL
HPV HR GENOTYPES PNL CVX NAA+PROBE: NEGATIVE
HPV HR GENOTYPES PNL CVX NAA+PROBE: NEGATIVE
HPV16 DNA SPEC QL NAA+PROBE: NEGATIVE
HPV18 DNA SPEC QL NAA+PROBE: NEGATIVE
LAB AP HPV GENOTYPE QUESTION: YES
LAB AP HPV HR: NORMAL
LABORATORY COMMENT REPORT: NORMAL
PATH REPORT.TOTAL CANCER: NORMAL

## 2023-11-14 ENCOUNTER — PHARMACY VISIT (OUTPATIENT)
Dept: PHARMACY | Facility: CLINIC | Age: 63
End: 2023-11-14
Payer: MEDICAID

## 2023-11-14 PROCEDURE — RXMED WILLOW AMBULATORY MEDICATION CHARGE

## 2023-11-21 ENCOUNTER — PHARMACY VISIT (OUTPATIENT)
Dept: PHARMACY | Facility: CLINIC | Age: 63
End: 2023-11-21
Payer: MEDICAID

## 2023-11-21 PROCEDURE — RXMED WILLOW AMBULATORY MEDICATION CHARGE

## 2023-11-24 NOTE — PROGRESS NOTES
"FUV for thyroid cancer. LV with Dr. Gan 2023.    History of Present Illness  63 y.o. female with hx of PTC s/p total thyroidectomy (2011) and GRANADOS (2012), post-surgical hypothyroidism, and type 2 diabetes.    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, +angiolymph invasion (*per previous provider notes; pathology report unavailable)  2012: GRANADOS (100 mCi)   Stimulated T.4 ()    WBS: uptake in thyroid bed only  2014: Stimulated Tg 36, Tg< 0.2  2020: WBS-faint focus in left thyroid bed  2023: CT neck wo contrast: no cervical lymphadenopathy    Current regimen: levothyroxine 100 mcg/day    Labs from 2023  TSH 0.05  Tg 0.1  Tg Ab neg    ROS  General: no fever or chills  CV: no chest pain   Respiratory: no shortness of breath  MSK: no lower extremity edema  Neuro: no headache or dizziness  See HPI for Endocrine ROS    Physical Exam   height is 1.638 m (5' 4.5\") and weight is 79.8 kg (176 lb). Her blood pressure is 123/76 and her pulse is 61.   General: not in acute distress  HEENT: ALISHA ESCOBAR  Thyroid: s/p total thyroidectomy, no palpable cervical LNs  Neuro: alert and oriented x 3    Current Outpatient Medications   Medication Sig Dispense Refill    albuterol 90 mcg/actuation inhaler Inhale.      amLODIPine (Norvasc) 10 mg tablet Take 1 tablet (10 mg) by mouth once daily. 90 tablet 3    atorvastatin (Lipitor) 20 mg tablet TAKE 1 TABLET (20 MG) BY MOUTH ONCE DAILY. 90 tablet 2    bisacodyl (Dulcolax) 5 mg EC tablet TAKE 1 TABLET BY MOUTH ONCE DAILY AS NEEDED FOR CONSTIPATION 30 tablet 3    calcium carbonate 600 mg calcium (1,500 mg) tablet TAKE 1 TABLET BY MOUTH TWO TIMES A DAY WITH MEALS 60 tablet 6    cetirizine (ZyrTEC) 10 mg tablet Take 1 tablet (10 mg) by mouth once daily.      cholecalciferol (Vitamin D-3) 25 MCG (1000 UT) capsule TAKE 1 CAPSULE BY MOUTH DAILY 30 capsule 8    docusate sodium (Colace) " 100 mg capsule Take 1 capsule (100 mg) by mouth once daily.      fluticasone (Flonase) 50 mcg/actuation nasal spray Administer into affected nostril(s).      furosemide (Lasix) 40 mg tablet Take 1 tablet (40 mg) by mouth 2 times a day.      hydroxyurea (Hydrea) 500 mg capsule 3 Tablet      hydrOXYzine HCL (Atarax) 10 mg tablet       levothyroxine (Synthroid, Levoxyl) 100 mcg tablet TAKE 1 TABLET DAILY AS DIRECTED. 90 tablet 2    lisinopril 40 mg tablet TAKE 1 TABLET BY MOUTH ONCE DAILY AS DIRECTED 90 tablet 3    metFORMIN (Glucophage) 1,000 mg tablet TAKE 1 TABLET BY MOUTH TWO TIMES A DAY WITH MEALS 180 tablet 3    benzalkonium chloride 0.13 % towelette Please check blood pressure daily and documen numbers into a journal. Please bring journal into clinic whenever you see your doctor.      gabapentin (Neurontin) 300 mg capsule Take 1 capsule (300 mg) by mouth once daily at bedtime. 30 capsule 0     No current facility-administered medications for this visit.       Assessment and Plan  63 y.o. female with hx of PTC s/p total thyroidectomy (2011) and GRANADOS (2012), post-surgical hypothyroidism, and type 2 diabetes.    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, +angiolymph invasion (*per previous provider notes; pathology report unavailable)  2012: GRANADOS (100 mCi)   Stimulated T.4 ()    WBS: uptake in thyroid bed only  2014: Stimulated Tg 36, Tg< 0.2  2020:    Stimulated T.3 ()   WBS-faint focus in left thyroid bed  2023:     CT neck wo contrast: no cervical lymphadenopathy    CT chest: subcentimeter pulmonary nodules stable since multiple prior exams    Current regimen: levothyroxine 100 mcg/day (since 2023)    Labs from 2023  TSH 0.05  Tg 0.1  Tg Ab neg  *LT4 decreased from 112 to 100 mcg/day    Tg has been undetectable or low at 0.1 since 2017.  No structural disease  on imaging, She has had an excellent response to surgery and GRANADOS.  Will need to repeat TSH today.    PLAN:  -check TSH now  -check TSH, Tg, Tg Ab before next visit   -US neck in 08/2024  -diabetes managed by PCP    Will call with results.  Follow-up in 6 months (labs prior)

## 2023-11-29 ENCOUNTER — OFFICE VISIT (OUTPATIENT)
Dept: ENDOCRINOLOGY | Facility: CLINIC | Age: 63
End: 2023-11-29
Payer: MEDICAID

## 2023-11-29 ENCOUNTER — LAB (OUTPATIENT)
Dept: LAB | Facility: LAB | Age: 63
End: 2023-11-29
Payer: MEDICAID

## 2023-11-29 VITALS
WEIGHT: 176 LBS | HEART RATE: 61 BPM | HEIGHT: 65 IN | BODY MASS INDEX: 29.32 KG/M2 | DIASTOLIC BLOOD PRESSURE: 76 MMHG | SYSTOLIC BLOOD PRESSURE: 123 MMHG

## 2023-11-29 DIAGNOSIS — E11.9 TYPE 2 DIABETES MELLITUS WITHOUT COMPLICATION, WITHOUT LONG-TERM CURRENT USE OF INSULIN (MULTI): ICD-10-CM

## 2023-11-29 DIAGNOSIS — C73 FOLLICULAR CARCINOMA OF THYROID GLAND (MULTI): ICD-10-CM

## 2023-11-29 DIAGNOSIS — E89.0 HYPOTHYROIDISM, POSTSURGICAL: Primary | ICD-10-CM

## 2023-11-29 DIAGNOSIS — E89.0 HYPOTHYROIDISM, POSTSURGICAL: ICD-10-CM

## 2023-11-29 LAB
POC HEMOGLOBIN A1C: 6 % (ref 4.2–6.5)
TSH SERPL-ACNC: 2 MIU/L (ref 0.44–3.98)

## 2023-11-29 PROCEDURE — 36415 COLL VENOUS BLD VENIPUNCTURE: CPT

## 2023-11-29 PROCEDURE — 3074F SYST BP LT 130 MM HG: CPT | Performed by: STUDENT IN AN ORGANIZED HEALTH CARE EDUCATION/TRAINING PROGRAM

## 2023-11-29 PROCEDURE — 4010F ACE/ARB THERAPY RXD/TAKEN: CPT | Performed by: STUDENT IN AN ORGANIZED HEALTH CARE EDUCATION/TRAINING PROGRAM

## 2023-11-29 PROCEDURE — 84443 ASSAY THYROID STIM HORMONE: CPT

## 2023-11-29 PROCEDURE — 1126F AMNT PAIN NOTED NONE PRSNT: CPT | Performed by: STUDENT IN AN ORGANIZED HEALTH CARE EDUCATION/TRAINING PROGRAM

## 2023-11-29 PROCEDURE — 1159F MED LIST DOCD IN RCRD: CPT | Performed by: STUDENT IN AN ORGANIZED HEALTH CARE EDUCATION/TRAINING PROGRAM

## 2023-11-29 PROCEDURE — 3044F HG A1C LEVEL LT 7.0%: CPT | Performed by: STUDENT IN AN ORGANIZED HEALTH CARE EDUCATION/TRAINING PROGRAM

## 2023-11-29 PROCEDURE — 1160F RVW MEDS BY RX/DR IN RCRD: CPT | Performed by: STUDENT IN AN ORGANIZED HEALTH CARE EDUCATION/TRAINING PROGRAM

## 2023-11-29 PROCEDURE — 3078F DIAST BP <80 MM HG: CPT | Performed by: STUDENT IN AN ORGANIZED HEALTH CARE EDUCATION/TRAINING PROGRAM

## 2023-11-29 PROCEDURE — 83036 HEMOGLOBIN GLYCOSYLATED A1C: CPT | Performed by: STUDENT IN AN ORGANIZED HEALTH CARE EDUCATION/TRAINING PROGRAM

## 2023-11-29 PROCEDURE — 99214 OFFICE O/P EST MOD 30 MIN: CPT | Performed by: STUDENT IN AN ORGANIZED HEALTH CARE EDUCATION/TRAINING PROGRAM

## 2023-11-29 RX ORDER — LEVOTHYROXINE SODIUM 100 UG/1
TABLET ORAL
Qty: 90 TABLET | Refills: 3 | Status: SHIPPED | OUTPATIENT
Start: 2023-11-29 | End: 2023-11-29 | Stop reason: SDUPTHER

## 2023-11-29 RX ORDER — LEVOTHYROXINE SODIUM 100 UG/1
TABLET ORAL
Qty: 96 TABLET | Refills: 3 | Status: SHIPPED | OUTPATIENT
Start: 2023-11-29

## 2023-11-29 NOTE — PATIENT INSTRUCTIONS
Please have blood work done today    2.   Please do blood work again 1 week before your next appointment with Endocrinology

## 2023-12-12 ENCOUNTER — OFFICE VISIT (OUTPATIENT)
Dept: OPHTHALMOLOGY | Facility: CLINIC | Age: 63
End: 2023-12-12
Payer: MEDICAID

## 2023-12-12 DIAGNOSIS — Z96.1 PSEUDOPHAKIA, LEFT EYE: Primary | ICD-10-CM

## 2023-12-12 PROCEDURE — 99024 POSTOP FOLLOW-UP VISIT: CPT | Performed by: OPHTHALMOLOGY

## 2023-12-12 ASSESSMENT — REFRACTION_MANIFEST
OD_AXIS: 125
OS_CYLINDER: -1.50
OS_AXIS: 080
OD_SPHERE: +1.00
OS_SPHERE: +1.50
OD_CYLINDER: -1.00

## 2023-12-12 ASSESSMENT — TONOMETRY
IOP_METHOD: TONOPEN
OS_IOP_MMHG: 13
OD_IOP_MMHG: 10

## 2023-12-12 ASSESSMENT — VISUAL ACUITY
METHOD: SNELLEN - LINEAR
OD_SC: 20/25-3
OS_SC: 20/30-1

## 2023-12-12 ASSESSMENT — EXTERNAL EXAM - LEFT EYE: OS_EXAM: NORMAL

## 2023-12-12 ASSESSMENT — SLIT LAMP EXAM - LIDS
COMMENTS: NORMAL
COMMENTS: NORMAL

## 2023-12-12 ASSESSMENT — EXTERNAL EXAM - RIGHT EYE: OD_EXAM: NORMAL

## 2023-12-20 DIAGNOSIS — I10 PRIMARY HYPERTENSION: Primary | ICD-10-CM

## 2023-12-20 PROCEDURE — RXMED WILLOW AMBULATORY MEDICATION CHARGE

## 2023-12-21 ENCOUNTER — PHARMACY VISIT (OUTPATIENT)
Dept: PHARMACY | Facility: CLINIC | Age: 63
End: 2023-12-21
Payer: MEDICAID

## 2023-12-23 RX ORDER — LISINOPRIL 40 MG/1
40 TABLET ORAL DAILY
Qty: 90 TABLET | Refills: 3 | Status: SHIPPED | OUTPATIENT
Start: 2023-12-23 | End: 2024-12-21

## 2023-12-24 PROCEDURE — RXMED WILLOW AMBULATORY MEDICATION CHARGE

## 2023-12-27 ENCOUNTER — PHARMACY VISIT (OUTPATIENT)
Dept: PHARMACY | Facility: CLINIC | Age: 63
End: 2023-12-27
Payer: MEDICAID

## 2024-01-11 ENCOUNTER — LAB (OUTPATIENT)
Dept: LAB | Facility: LAB | Age: 64
End: 2024-01-11
Payer: MEDICAID

## 2024-01-11 ENCOUNTER — HOSPITAL ENCOUNTER (OUTPATIENT)
Dept: RADIOLOGY | Facility: HOSPITAL | Age: 64
Discharge: HOME | End: 2024-01-11
Payer: MEDICAID

## 2024-01-11 DIAGNOSIS — Z13.820 SCREENING FOR OSTEOPOROSIS: ICD-10-CM

## 2024-01-11 DIAGNOSIS — E89.0 HYPOTHYROIDISM, POSTSURGICAL: ICD-10-CM

## 2024-01-11 LAB — TSH SERPL-ACNC: 0.8 MIU/L (ref 0.44–3.98)

## 2024-01-11 PROCEDURE — 77080 DXA BONE DENSITY AXIAL: CPT | Performed by: RADIOLOGY

## 2024-01-11 PROCEDURE — 77080 DXA BONE DENSITY AXIAL: CPT

## 2024-01-11 PROCEDURE — 36415 COLL VENOUS BLD VENIPUNCTURE: CPT

## 2024-01-11 PROCEDURE — 84443 ASSAY THYROID STIM HORMONE: CPT

## 2024-01-12 ENCOUNTER — TELEPHONE (OUTPATIENT)
Dept: PRIMARY CARE | Facility: CLINIC | Age: 64
End: 2024-01-12
Payer: MEDICAID

## 2024-01-12 DIAGNOSIS — M85.80 OSTEOPENIA, UNSPECIFIED LOCATION: Primary | ICD-10-CM

## 2024-01-12 PROCEDURE — RXMED WILLOW AMBULATORY MEDICATION CHARGE

## 2024-01-12 RX ORDER — CALCIUM CARBONATE 600 MG
750 TABLET ORAL
Qty: 90 TABLET | Refills: 3 | Status: SHIPPED | OUTPATIENT
Start: 2024-01-12 | End: 2025-01-10

## 2024-01-12 RX ORDER — VIT C/E/ZN/COPPR/LUTEIN/ZEAXAN 250MG-90MG
25 CAPSULE ORAL DAILY
Qty: 30 CAPSULE | Refills: 8 | Status: CANCELLED | OUTPATIENT
Start: 2024-01-12 | End: 2025-01-10

## 2024-01-12 RX ORDER — CHOLECALCIFEROL (VITAMIN D3) 50 MCG
50 TABLET ORAL DAILY
Qty: 90 TABLET | Refills: 3 | Status: SHIPPED | OUTPATIENT
Start: 2024-01-12 | End: 2025-01-11

## 2024-01-13 PROCEDURE — RXMED WILLOW AMBULATORY MEDICATION CHARGE

## 2024-01-13 NOTE — TELEPHONE ENCOUNTER
Called patient to discuss results of bone density test. Spoke for approx 6min. Discussed osteopenia, patient states she was previously on calcium and vitamin D, but the RX ran out, amenable to taking this again, RX sent to pharmacy. All questions answered, patient thankful for call.    Sierra Zavala MD  Family Medicine PGY-3

## 2024-01-16 PROCEDURE — RXMED WILLOW AMBULATORY MEDICATION CHARGE

## 2024-01-17 ENCOUNTER — PHARMACY VISIT (OUTPATIENT)
Dept: PHARMACY | Facility: CLINIC | Age: 64
End: 2024-01-17
Payer: MEDICAID

## 2024-02-01 DIAGNOSIS — K59.00 CONSTIPATION, UNSPECIFIED CONSTIPATION TYPE: Primary | ICD-10-CM

## 2024-02-02 ENCOUNTER — PHARMACY VISIT (OUTPATIENT)
Dept: PHARMACY | Facility: CLINIC | Age: 64
End: 2024-02-02
Payer: MEDICAID

## 2024-02-02 PROCEDURE — RXMED WILLOW AMBULATORY MEDICATION CHARGE

## 2024-02-02 RX ORDER — DOCUSATE SODIUM 100 MG/1
100 CAPSULE, LIQUID FILLED ORAL DAILY
Qty: 60 CAPSULE | Refills: 1 | Status: SHIPPED | OUTPATIENT
Start: 2024-02-02 | End: 2025-01-31

## 2024-02-20 ENCOUNTER — OFFICE VISIT (OUTPATIENT)
Dept: PRIMARY CARE | Facility: CLINIC | Age: 64
End: 2024-02-20
Payer: MEDICAID

## 2024-02-20 VITALS
HEIGHT: 65 IN | WEIGHT: 181 LBS | HEART RATE: 58 BPM | SYSTOLIC BLOOD PRESSURE: 127 MMHG | OXYGEN SATURATION: 97 % | DIASTOLIC BLOOD PRESSURE: 80 MMHG | BODY MASS INDEX: 30.16 KG/M2 | TEMPERATURE: 97.7 F

## 2024-02-20 DIAGNOSIS — E11.3219: ICD-10-CM

## 2024-02-20 DIAGNOSIS — I10 PRIMARY HYPERTENSION: Primary | ICD-10-CM

## 2024-02-20 DIAGNOSIS — M85.89 OSTEOPENIA OF MULTIPLE SITES: ICD-10-CM

## 2024-02-20 DIAGNOSIS — L82.1 DERMATOSIS PAPULOSA NIGRA: ICD-10-CM

## 2024-02-20 DIAGNOSIS — C73 FOLLICULAR CARCINOMA OF THYROID GLAND (MULTI): ICD-10-CM

## 2024-02-20 PROCEDURE — 3074F SYST BP LT 130 MM HG: CPT | Performed by: STUDENT IN AN ORGANIZED HEALTH CARE EDUCATION/TRAINING PROGRAM

## 2024-02-20 PROCEDURE — 1126F AMNT PAIN NOTED NONE PRSNT: CPT | Performed by: STUDENT IN AN ORGANIZED HEALTH CARE EDUCATION/TRAINING PROGRAM

## 2024-02-20 PROCEDURE — 99213 OFFICE O/P EST LOW 20 MIN: CPT | Performed by: STUDENT IN AN ORGANIZED HEALTH CARE EDUCATION/TRAINING PROGRAM

## 2024-02-20 PROCEDURE — 3079F DIAST BP 80-89 MM HG: CPT | Performed by: STUDENT IN AN ORGANIZED HEALTH CARE EDUCATION/TRAINING PROGRAM

## 2024-02-20 PROCEDURE — 90471 IMMUNIZATION ADMIN: CPT | Performed by: STUDENT IN AN ORGANIZED HEALTH CARE EDUCATION/TRAINING PROGRAM

## 2024-02-20 PROCEDURE — 4010F ACE/ARB THERAPY RXD/TAKEN: CPT | Performed by: STUDENT IN AN ORGANIZED HEALTH CARE EDUCATION/TRAINING PROGRAM

## 2024-02-20 PROCEDURE — 1160F RVW MEDS BY RX/DR IN RCRD: CPT | Performed by: STUDENT IN AN ORGANIZED HEALTH CARE EDUCATION/TRAINING PROGRAM

## 2024-02-20 PROCEDURE — 1159F MED LIST DOCD IN RCRD: CPT | Performed by: STUDENT IN AN ORGANIZED HEALTH CARE EDUCATION/TRAINING PROGRAM

## 2024-02-20 PROCEDURE — 90677 PCV20 VACCINE IM: CPT | Performed by: STUDENT IN AN ORGANIZED HEALTH CARE EDUCATION/TRAINING PROGRAM

## 2024-02-20 ASSESSMENT — ENCOUNTER SYMPTOMS: DEPRESSION: 0

## 2024-02-20 ASSESSMENT — PAIN SCALES - GENERAL: PAINLEVEL: 0-NO PAIN

## 2024-02-20 NOTE — PATIENT INSTRUCTIONS
Dear Katherine Sin,     It was a pleasure getting to manage your care with you today.     Prescriptions:  We have sent medication prescriptions to your pharmacy on file. Please pick them up at your earliest convenience.     Referral:   We have provided you the below referrals. Please call to schedule referrals if no one has contacted you within 3 days.   1. Podiatry  2. Dermatology    Follow up Appointment: 3 months with Dr. Zavala    Emergency  In the case of an emergency please call 911 or visit the Emergency Department immediately for evaluation.     We look forward to continuing your care here at our Clinic. Take Care.     Sincerely,   Fortunato Whiting MD

## 2024-02-20 NOTE — PROGRESS NOTES
"Subjective   Patient ID: Katherine Sin is a 63 y.o. female who presents for Follow-up.    No specific concerns today.     Had a normal pap last visit.    Had DEXA last visit which showed osteopenia. Restarted on calcium and vitamin D and taking them regularly.     Taking her blood pressure medicines on a regular basis as well. Taking metformin 1000 BID for her diabetes with good control.    Hasn't seen a foot doctor in years. Has a big corn that she wants to have evaluated.    Has skin lesions on the neck that she wants to have evaluated for possible removal.       Review of Systems  As above in HPI    Objective   /80 (BP Location: Right arm, Patient Position: Sitting, BP Cuff Size: Adult)   Pulse 58   Temp 36.5 °C (97.7 °F) (Oral)   Ht 1.638 m (5' 4.5\")   Wt 82.1 kg (181 lb)   SpO2 97%   BMI 30.59 kg/m²     Physical Exam  Vitals reviewed.   Constitutional:       General: She is not in acute distress.     Appearance: Normal appearance.   HENT:      Head: Normocephalic and atraumatic.   Eyes:      Conjunctiva/sclera: Conjunctivae normal.   Cardiovascular:      Rate and Rhythm: Normal rate and regular rhythm.      Heart sounds: No murmur heard.     No friction rub. No gallop.   Pulmonary:      Effort: Pulmonary effort is normal. No respiratory distress.      Breath sounds: No wheezing, rhonchi or rales.   Skin:     General: Skin is warm and dry.      Comments: Hyperpigmented papules scattered on the skin with large number of relatively large lesions on the L neck.   Neurological:      Mental Status: She is alert. Mental status is at baseline.   Psychiatric:         Behavior: Behavior normal.       Assessment/Plan   Katherine Sni is a 63 y.o. female with PMH of T2DM, HTN, PVD, HFpEF (EF 64% 2015), lumbar back pain, follicular carcinoma of thyroid, GERD, hemorrhoids, and smoking who presents to the clinic for follow up visit. Pneumonia vaccine given today.    Problem List Items Addressed This Visit  "            ICD-10-CM    Follicular carcinoma of thyroid gland (CMS/HCC) C73     -stable, follows with Dr. 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 associated with type 2 diabetes mellitus (CMS/HCC) E11.3219     -diabetes at goal of <7.0% with last A1c being 6.1%  -continue metformin 1000 mg BID  -podiatry referral provided         Relevant Orders    Referral to Podiatry    Hypertension - Primary I10     -stable, at goal of <130/80  -continue current regimen of amlodipine 10 mg daily, Lasix 40 mg BID, lisinopril 40 mg daily         Dermatosis papulosa nigra L82.1     -reported skin changes are c/w dermatosis papulosa nigra  -some may be amenable to in office removal which can be assessed further at next visit however dermatology referral was provided as well         Relevant Orders    Referral to Dermatology    Osteopenia of multiple sites M85.89     -diagnosed on recent DEXA  -continue vitamin D and calcium supplementation as prescribed          Follow up with PCP Dr. Zavala in 3 months for reassessment.    Patient discussed with Dr. Antunez.    Fortunato Whiting MD   PGY-3

## 2024-02-22 PROBLEM — L82.1 DERMATOSIS PAPULOSA NIGRA: Status: ACTIVE | Noted: 2024-02-22

## 2024-02-22 PROBLEM — M85.89 OSTEOPENIA OF MULTIPLE SITES: Status: ACTIVE | Noted: 2024-02-22

## 2024-02-22 PROBLEM — E11.3219: Status: ACTIVE | Noted: 2023-06-13

## 2024-02-22 NOTE — ASSESSMENT & PLAN NOTE
>>ASSESSMENT AND PLAN FOR BACKGROUND DIABETIC MACULAR EDEMA WITH MILD NONPROLIFERATIVE RETINOPATHY ASSOCIATED WITH TYPE 2 DIABETES MELLITUS (MULTI) WRITTEN ON 2/22/2024  9:28 AM BY RICH CLAYTON MD    -diabetes at goal of <7.0% with last A1c being 6.1%  -continue metformin 1000 mg BID  -podiatry referral provided

## 2024-02-22 NOTE — ASSESSMENT & PLAN NOTE
-diabetes at goal of <7.0% with last A1c being 6.1%  -continue metformin 1000 mg BID  -podiatry referral provided

## 2024-02-22 NOTE — ASSESSMENT & PLAN NOTE
-stable, at goal of <130/80  -continue current regimen of amlodipine 10 mg daily, Lasix 40 mg BID, lisinopril 40 mg daily

## 2024-02-22 NOTE — ASSESSMENT & PLAN NOTE
-reported skin changes are c/w dermatosis papulosa nigra  -some may be amenable to in office removal which can be assessed further at next visit however dermatology referral was provided as well

## 2024-02-22 NOTE — ASSESSMENT & PLAN NOTE
-stable, follows with Dr. Ramírez with endocrine for monitoring  -last TSH was WNL  -continue levothyroxine 100 mcg daily as prescribed by endocrine

## 2024-02-24 NOTE — PROGRESS NOTES
I reviewed the resident/fellow's documentation and discussed the patient with the resident/fellow. I agree with the resident/fellow's medical decision making as documented in the note.   Would encourage program of weight lifting exercise.  Re-check DEXA in 2 years.    Mallika Antunez MD

## 2024-03-04 ENCOUNTER — OFFICE VISIT (OUTPATIENT)
Dept: PULMONOLOGY | Facility: HOSPITAL | Age: 64
End: 2024-03-04
Payer: MEDICAID

## 2024-03-04 VITALS
HEART RATE: 57 BPM | DIASTOLIC BLOOD PRESSURE: 73 MMHG | SYSTOLIC BLOOD PRESSURE: 119 MMHG | WEIGHT: 189 LBS | TEMPERATURE: 97.3 F | BODY MASS INDEX: 31.94 KG/M2 | OXYGEN SATURATION: 93 %

## 2024-03-04 DIAGNOSIS — J43.9 PULMONARY EMPHYSEMA, UNSPECIFIED EMPHYSEMA TYPE (MULTI): ICD-10-CM

## 2024-03-04 DIAGNOSIS — R06.09 DOE (DYSPNEA ON EXERTION): Primary | ICD-10-CM

## 2024-03-04 PROCEDURE — 3074F SYST BP LT 130 MM HG: CPT | Performed by: NURSE PRACTITIONER

## 2024-03-04 PROCEDURE — 1160F RVW MEDS BY RX/DR IN RCRD: CPT | Performed by: NURSE PRACTITIONER

## 2024-03-04 PROCEDURE — 99214 OFFICE O/P EST MOD 30 MIN: CPT | Performed by: NURSE PRACTITIONER

## 2024-03-04 PROCEDURE — 1159F MED LIST DOCD IN RCRD: CPT | Performed by: NURSE PRACTITIONER

## 2024-03-04 PROCEDURE — 4010F ACE/ARB THERAPY RXD/TAKEN: CPT | Performed by: NURSE PRACTITIONER

## 2024-03-04 PROCEDURE — 1126F AMNT PAIN NOTED NONE PRSNT: CPT | Performed by: NURSE PRACTITIONER

## 2024-03-04 PROCEDURE — 3078F DIAST BP <80 MM HG: CPT | Performed by: NURSE PRACTITIONER

## 2024-03-04 SDOH — ECONOMIC STABILITY: FOOD INSECURITY: WITHIN THE PAST 12 MONTHS, THE FOOD YOU BOUGHT JUST DIDN'T LAST AND YOU DIDN'T HAVE MONEY TO GET MORE.: NEVER TRUE

## 2024-03-04 SDOH — ECONOMIC STABILITY: FOOD INSECURITY: WITHIN THE PAST 12 MONTHS, YOU WORRIED THAT YOUR FOOD WOULD RUN OUT BEFORE YOU GOT MONEY TO BUY MORE.: NEVER TRUE

## 2024-03-04 ASSESSMENT — ENCOUNTER SYMPTOMS
NAUSEA: 0
BACK PAIN: 1
RHINORRHEA: 0
AGITATION: 0
MYALGIAS: 0
ARTHRALGIAS: 0
EYE PAIN: 0
VOICE CHANGE: 0
NERVOUS/ANXIOUS: 0
FATIGUE: 0
JOINT SWELLING: 0
DIARRHEA: 0
HEADACHES: 0
VOMITING: 0
DIZZINESS: 0
PALPITATIONS: 0
FEVER: 0
WEAKNESS: 0
SINUS PRESSURE: 0
ABDOMINAL PAIN: 0
NUMBNESS: 1

## 2024-03-04 ASSESSMENT — PATIENT HEALTH QUESTIONNAIRE - PHQ9
SUM OF ALL RESPONSES TO PHQ9 QUESTIONS 1 & 2: 0
2. FEELING DOWN, DEPRESSED OR HOPELESS: NOT AT ALL
1. LITTLE INTEREST OR PLEASURE IN DOING THINGS: NOT AT ALL

## 2024-03-04 ASSESSMENT — PAIN SCALES - GENERAL: PAINLEVEL: 0-NO PAIN

## 2024-03-04 ASSESSMENT — LIFESTYLE VARIABLES
HOW OFTEN DO YOU HAVE SIX OR MORE DRINKS ON ONE OCCASION: DAILY OR ALMOST DAILY
HOW OFTEN DO YOU HAVE A DRINK CONTAINING ALCOHOL: 4 OR MORE TIMES A WEEK
HOW MANY STANDARD DRINKS CONTAINING ALCOHOL DO YOU HAVE ON A TYPICAL DAY: 5 OR 6
SKIP TO QUESTIONS 9-10: 0
AUDIT-C TOTAL SCORE: 10

## 2024-03-04 NOTE — PATIENT INSTRUCTIONS
63 year old AAF (current smoker- 30 pack years ) here for follow up for PHILLIPS and pulmonary nodules. Last seen in clinic on 10/5/23.     1. Dyspnea on exertion: has been going on for 8+ years since back surgery. Emphysema on CT as well as history of NICM, but likely also a component of deconditioning as well. PFTs in 1/2020 without obstruction. Improvement in symptoms and has only been using spiriva and proair PRN. CT chest with some mosaic attenuation - PFTs 2021  stable from 2020. 6MWT WNL -> no need for oxygen.  - continue proair 2 puffs every 4-6 hours as needed for shortness of breath   - PFTs- scheduled later this week     2. Allergic rhinitis: PRN cetirizine and flonase - has been using the flonase intermittently at night   - continue fluticasone (flonase) 1 spray each nostril 1-2 x per day - remember to aim towards your ear   - continue nasal saline over the counter - use 2-3 x per day to rinse out nasal passages and keep them moist   - continue cetirizine (zyrtec) 10mg daily as needed for allergy symptoms     3. Pulmonary nodules : Hx of thyroid cancer s/p thyroidectomy in 2012 - Nodules stable since 11/2019 and even some from 2014   - continue annual CT scan through endocrinology - when no longer on surveillance will qualify for lung cancer screening.     4. Smoking cessation: smoked 1 PPD x 20- 25 years years and then about 6 cigarettes per day the rest of the time ~ 30 pack years. Not interested in quitting at this time. smokes 1/2 ppd   - > 5 minutes smoking cessation counseling    5. Pulmonary edema: seen on CXR. Seen by cardiology back in 2019. Minimal PHILLIPS today in clinic.   - pt states she feels well and does not want a referral back to see cardiology     Thank you for visiting the Pulmonary clinic today!   Return to clinic 1 year  or sooner if needed   Berta Valdez CNP  My office number is [phone] - Cammy is my  and So is my nurse.   [phone] - scheduling

## 2024-03-04 NOTE — PROGRESS NOTES
Patient: Katherine Sin    [id number]  : 1960 -- AGE 63 y.o.    Provider: DORIS Amador-CNP     Location [facility]   Service Date: 3/4/2024              [facility]  Follow up visit Note      HISTORY OF PRESENT ILLNESS       HISTORY OF PRESENT ILLNESS   63 year old AAF (current smoker- 30 pack years ) here for follow up for PHILLIPS and pulmonary nodules. Last seen in clinic on 10/5/23.     Since last visit she has been doing well. She rarely needs to use her proair. She has a rare cough - mostly a dry cough. She denies any wheezing,  SOB at rest, CP, or GERD. She notices PHILLIPS with walking on level ground after a few minutes - not enough that she feels she needs her inhaler.  She has been taking her zyrtec PRN. She has PRN flonase and nasal saline - has not needed.  She states she is still smoking - 3-4. She states when she drinks beer she smokes more - more like 6-7 cigarettes per day.      ACT today 25     10/5/23: She had a little virus for which she presented to the ED 2 weeks ago. They told her she had a little water on her lungs. She states they did not give her anything for it.  She is upset they didn't do much for her -- gave her a breathing treatment and sent her home. She states she is taking her lasix. She has not seen cardiology since 2019. She has a PRN albuterol that she does not use that often. She does not have a nebulizer at home to do breathing treatments.  She was on the sprivia PRN and did not use it. She is not interested in any maintenance inhalers at this time.  -- She has PHILLIPS with going up stairs/ hill. She still is coughing some that is productive with clear mucous. She feels cough is improving.  She has noticed some wheezing when she was sick. She denies any SOB at rest or CP. She had some chest pain when she was sick, but it has since resolved. She takes cetirizine and flonase as needed - has not needed recently.  She has some intermittent GERD symptoms depending on what she eats - once a month. She is still smoking 8-10 cigarettes.      Dr. Bowser -- Interim 04 18 23   Routine follow up. Patient is previosuly followed up by Berta Judge  No new symptoms. She continues to smoke half a pack per day. She has been smoking since age 14. She is not ready to quit and not willing to consider treatment such as nicotine replacement therapy or pharmacotherapy. She denies any emergency room visit sick visits or hospitalization for any breathing problems. She had lost a few pounds intentionally. She remains active and can go on daily walks on level ground. She does use a walker because of back pain. She continues to have chronic cough productive of yellowish sputum that is not any worse over the last few days to week. Denies fever chills night sweats. Denies wheezing. She uses albuterol as needed only when she goes on walks. No nocturnal awakening with shortness of breath.      Since last visit on 12/13/21, patient states that her breathing has been good and hasn't had any problems. States that she really doesn’t even need to use her rescue. States that if she were to walk 2 city blocks, she might need to stop and catch her breath and use her inhaler. Otherwise, is feeling just fine. Denies cough, wheezing, SOB at rest, chest pain. Has very mild and intermittent GERD symptoms. Allergies under good control with use of daily flonase, nasal saline and nightly zyrtec. Sleeping well. No recent visits to UC or ER and has not needed ATB or steroids. Since last visit, she has cut back her smoking to about 2-3 cigarettes/day. If she is drinking beer (usually 2 days a week) she will smoke a little more upwards of about 6/day.    12/13/21: HPI: Since last visit she has been doing well. She uses her inhalers PRN - spiriva handihaler and proair. She has not used either of her inhalers for several weeks. She denies any cough, wheezing, SOB at  rest, or CP. She has PHILLIPS with walking > blocks. She has occasional runny nose/nasal congestion/ post nasal drip. She states her nose is dry and stuffy at night. She uses her flonase/nasal saline at night before she goes to bed and in the morning. She will take cetirizine PRN for worsening symptoms. She has rare GERD symptoms - depends on what she eats. She is still smoking and is smoking 2 packs a week.   CAT today 13    11/3/20: She has PHILLIPS, but denies SOB at rest. She states she had back surgery in 2016 and it has been that way since then and she never returned to her baseline. She has PHILLIPS with walking up or down stairs or a hill. She is able to walk better and further either with her rollator or with the shopping cart at the grocery store. She has PRN proair and it does help when she uses it, but she rarely uses it. She does have an aid that helps her around the house. She denies any cough or wheezing. She takes cetrizine 10mg dialy as needed for allergies as well as flonase PRN. She has not needed either recently. She has a history of GERD for which she takes PRN famotidine. She has not needed it recently She denies any CP or LE edema.   CAT today 22     Previous pulmonary history: She has no history of recurrent infections, or lung disease as a child. She had no previous lung hx, never on oxygen or inhaler therapy. .    Inhalers/nebulized medications: PRN proair     Hospitalization History:   She has not been hospitalized over the last year for breathing related problem.    Sleep history: She states she does snore, but denies witnessed apneas. She denies any trouble falling asleep, but dose doze during the day.    Comorbidities:  - DMII   - HTN   - GERD - on famotidine PRN - does not need often   - HLD  - thyroid cancer - s/p thyroidectomy in 2012   - left foot surgery in 1970s  - right ankle surgery - 2007   - NICM - diastolic heart failure     SH:  smoking: smoked 1 PPD x 20- 25 years years and then about 6  cigarettes per day the rest of the time ~ 30 pack years   drinkin drink per week   illicit drug use: none      Occupation: She is now on disability related to her heart failure - previously was working in a day care. No known toxic exposures.     Family History: No family history of lung diseases or cancer        ALLERGIES AND MEDICATIONS     ALLERGIES  Allergies   Allergen Reactions    Hydromorphone Itching and Other    Ibuprofen Itching and Other    Topiramate Itching and Other    Tramadol Itching and Other       MEDICATIONS  Current Outpatient Medications   Medication Sig Dispense Refill    albuterol 90 mcg/actuation inhaler Inhale.      amLODIPine (Norvasc) 10 mg tablet Take 1 tablet (10 mg) by mouth once daily. 90 tablet 3    atorvastatin (Lipitor) 20 mg tablet TAKE 1 TABLET (20 MG) BY MOUTH ONCE DAILY. 90 tablet 2    bisacodyl (Dulcolax) 5 mg EC tablet TAKE 1 TABLET BY MOUTH ONCE DAILY AS NEEDED FOR CONSTIPATION 30 tablet 3    calcium carbonate 600 mg calcium (1,500 mg) tablet Take 0.5 tablets (750 mg) by mouth 2 times a day with meals. 90 tablet 3    cetirizine (ZyrTEC) 10 mg tablet Take 1 tablet (10 mg) by mouth once daily.      cholecalciferol (Vitamin D-3) 50 MCG (2000 UT) tablet Take 1 tablet (50 mcg) by mouth once daily. 90 tablet 3    docusate sodium (Colace) 100 mg capsule TAKE 1 CAPSULE BY MOUTH DAILY 60 capsule 1    fluticasone (Flonase) 50 mcg/actuation nasal spray Administer into affected nostril(s).      furosemide (Lasix) 40 mg tablet Take 1 tablet (40 mg) by mouth 2 times a day.      hydroxyurea (Hydrea) 500 mg capsule 3 Tablet      hydrOXYzine HCL (Atarax) 10 mg tablet       levothyroxine (Synthroid, Levoxyl) 100 mcg tablet Take 1 tablet Mon-Sat, take 1.5 tablets on Sun (first thing in the morning on an empty stomach with water, wait 30 mins before eating/drinking/taking other meds). 96 tablet 3    lisinopril 40 mg tablet TAKE 1 TABLET BY MOUTH ONCE DAILY AS DIRECTED 90 tablet 3     metFORMIN (Glucophage) 1,000 mg tablet TAKE 1 TABLET BY MOUTH TWO TIMES A DAY WITH MEALS 180 tablet 3    benzalkonium chloride 0.13 % towelette Please check blood pressure daily and documen numbers into a journal. Please bring journal into clinic whenever you see your doctor.      gabapentin (Neurontin) 300 mg capsule Take 1 capsule (300 mg) by mouth once daily at bedtime. 30 capsule 0     No current facility-administered medications for this visit.         PAST HISTORY     PAST SURGICAL HISTORY  Past Surgical History:   Procedure Laterality Date    CARDIAC CATHETERIZATION  07/23/2013    Cardiac Cath Procedure Outcome:    CATARACT EXTRACTION      COLONOSCOPY  06/24/2013    Complete Colonoscopy    THYROID SURGERY  07/23/2013    Thyroid Surgery       IMMUNIZATION HISTORY  Immunization History   Administered Date(s) Administered    Flu vaccine (IIV4), preservative free *Check age/dose* 02/02/2017    Flu vaccine, quadrivalent, high-dose, preservative free, age 65y+ (FLUZONE) 10/20/2023    Influenza, Unspecified 10/07/2015, 03/06/2018, 12/17/2019, 10/19/2020, 10/15/2021    Influenza, seasonal, injectable 10/07/2015    Pfizer COVID-19 vaccine, Fall 2023, 12 years and older, (30mcg/0.3mL) 10/26/2023    Pfizer COVID-19 vaccine, bivalent, age 12 years and older (30 mcg/0.3 mL) 12/14/2022    Pfizer Purple Cap SARS-CoV-2 05/21/2021, 06/11/2021, 12/09/2021    Pneumococcal conjugate vaccine, 20-valent (PREVNAR 20) 02/20/2024    Pneumococcal polysaccharide vaccine, 23-valent, age 2 years and older (PNEUMOVAX 23) 10/30/2015, 07/09/2019    Tdap vaccine, age 7 year and older (BOOSTRIX, ADACEL) 10/19/2020    Zoster, Unspecified 07/09/2019, 09/17/2019       FAMILY HISTORY  Family History   Problem Relation Name Age of Onset    Diabetes Sister      Other (TYPE C VIRAL HEPATITIS) Brother         REVIEW OF SYSTEMS     REVIEW OF SYSTEMS  Review of Systems   Constitutional:  Negative for fatigue and fever.   HENT:  Positive for congestion.  Negative for postnasal drip, rhinorrhea, sinus pressure and voice change.    Eyes:  Negative for pain and visual disturbance.   Cardiovascular:  Negative for chest pain, palpitations and leg swelling.   Gastrointestinal:  Negative for abdominal pain, diarrhea, nausea and vomiting.   Endocrine: Negative for cold intolerance and heat intolerance.   Musculoskeletal:  Positive for back pain. Negative for arthralgias, joint swelling and myalgias.   Skin:  Negative for rash.   Neurological:  Positive for numbness. Negative for dizziness, weakness and headaches.   Psychiatric/Behavioral:  Negative for agitation. The patient is not nervous/anxious.        PHYSICAL EXAM     VITAL SIGNS: /73 (BP Location: Right arm, Patient Position: Sitting)   Pulse 57   Temp 36.3 °C (97.3 °F) (Temporal)   Wt 85.7 kg (189 lb)   SpO2 93% Comment: RA  BMI 31.94 kg/m²      CURRENT WEIGHT: [unfilled]  BMI: [unfilled]  PREVIOUS WEIGHTS:  Wt Readings from Last 3 Encounters:   03/04/24 85.7 kg (189 lb)   02/20/24 82.1 kg (181 lb)   11/29/23 79.8 kg (176 lb)       Physical Exam  Vitals reviewed.   Constitutional:       General: She is not in acute distress.     Appearance: Normal appearance. She is not ill-appearing or toxic-appearing.   HENT:      Head: Normocephalic.      Nose: No rhinorrhea.   Cardiovascular:      Rate and Rhythm: Normal rate and regular rhythm.      Heart sounds: Normal heart sounds.   Pulmonary:      Effort: Pulmonary effort is normal. No respiratory distress.      Breath sounds: Normal breath sounds. No stridor. No wheezing, rhonchi or rales.   Abdominal:      General: Abdomen is flat.   Musculoskeletal:         General: Normal range of motion.      Right lower leg: No edema.      Left lower leg: No edema.   Skin:     General: Skin is warm and dry.      Nails: There is no clubbing.   Neurological:      General: No focal deficit present.      Mental Status: She is alert and oriented to person, place, and time.    Psychiatric:         Mood and Affect: Mood normal.         Behavior: Behavior normal.         Judgment: Judgment normal.           RESULTS/DATA     Pulmonary Function Test Results       Testing:   PFTs:   - 6/19/13: FEV1/FVC 0.84/FEV1 2.01 (86%)/ FVC 2.51 (86%)/ TLC 3.67 (81%)/   - 1/13/20: FEV1/FVC 0.88/ FEV1 2.12 (99%)/ FVC 2.38 (87%)/ TLC 3.58 (80%)/ DLCO 72%  - 6/13/22: FEV1/FVC 0.80/ FEV1 1.95 (93%)/ FVC 2.44 (92%)/ TLC 4.10 (92%)/ DLCO 74%   - 3/7/24 - FEV1/FVC 0.69 [z score 1.3] / FEV1 1.75 (76%)/ FVC 2.52 (86%)     6MWT  - 6/13/22: 384m (319m LLN) 97% -> 92% on RA. MOLLY: 0    Chest Radiograph     XR chest 2 view 09/22/2023  Impression  Mild interstitial edema. Enlarged cardiac silhouette. No  consolidation seen      Chest CT Scan     CT chest:   - 6/16/20: stable pulmonary nodules, mosaic attenuation  - 8/13/21 - No new pulmonary nodules. Stable pulmonary nodules as described above. Unchanged mild diffuse mosaic attenuation, consistent with small airway disease. Stable patchy ground-glass opacity with associated traction  bronchiectasis within the left upper lobe, likely infectious/inflammatory in etiology. Unchanged lower lobe predominant diffuse mosaic pattern, consistent with small airspace/small vessel disease. Status post thyroidectomy. No evidence of tumor recurrence within the thyroid bed. No evidence of thoracic lymphadenopathy.  -10 2022 stable micronodules. Mosaic attenuation but also groundglass opacities in the anterior upper lobes mainly on the left stable compared to prior.  - 8/22/23 -  No definite evidence of metastatic disease in the chest. Few  bilateral noncalcified pulmonary nodules measuring up to 6 mm are  stable since multiple prior examinations, favoring benign etiology.  2. Additional stable chronic findings as above.         Echocardiogram     Echo: 10/2/15: EF normal       ASSESSMENT/PLAN     63 year old AAF (current smoker- 30 pack years ) here for follow up for PHILLIPS and  pulmonary nodules. Last seen in clinic on 10/5/23.     1. Dyspnea on exertion: has been going on for 8+ years since back surgery. Emphysema on CT as well as history of NICM, but likely also a component of deconditioning as well. PFTs in 1/2020 without obstruction. Improvement in symptoms and has only been using spiriva and proair PRN. CT chest with some mosaic attenuation - PFTs 2021  stable from 2020. 6MWT WNL -> no need for oxygen.  - continue proair 2 puffs every 4-6 hours as needed for shortness of breath   - PFTs- scheduled later this week -- LVM 3/21/24    2. Allergic rhinitis: PRN cetirizine and flonase - has been using the flonase intermittently at night   - continue fluticasone (flonase) 1 spray each nostril 1-2 x per day - remember to aim towards your ear   - continue nasal saline over the counter - use 2-3 x per day to rinse out nasal passages and keep them moist   - continue cetirizine (zyrtec) 10mg daily as needed for allergy symptoms     3. Pulmonary nodules : Hx of thyroid cancer s/p thyroidectomy in 2012 - Nodules stable since 11/2019 and even some from 2014   - continue annual CT scan through endocrinology - when no longer on surveillance will qualify for lung cancer screening.     4. Smoking cessation: smoked 1 PPD x 20- 25 years years and then about 6 cigarettes per day the rest of the time ~ 30 pack years. Not interested in quitting at this time. smokes 1/2 ppd   - > 5 minutes smoking cessation counseling    5. Pulmonary edema: seen on CXR. Seen by cardiology back in 2019. Minimal PHILLIPS today in clinic.   - pt states she feels well and does not want a referral back to see cardiology     Thank you for visiting the Pulmonary clinic today!   Return to clinic 1 year or sooner if needed   Berta Valdez CNP  My office number is [phone] - Cammy is my  and So is my nurse.   [phone] - scheduling

## 2024-03-07 ENCOUNTER — APPOINTMENT (OUTPATIENT)
Dept: PULMONOLOGY | Facility: HOSPITAL | Age: 64
End: 2024-03-07
Payer: MEDICAID

## 2024-03-07 ENCOUNTER — HOSPITAL ENCOUNTER (OUTPATIENT)
Dept: RESPIRATORY THERAPY | Facility: HOSPITAL | Age: 64
Discharge: HOME | End: 2024-03-07
Payer: MEDICAID

## 2024-03-07 DIAGNOSIS — R06.09 DOE (DYSPNEA ON EXERTION): ICD-10-CM

## 2024-03-07 PROCEDURE — 94010 BREATHING CAPACITY TEST: CPT

## 2024-03-07 PROCEDURE — 94010 BREATHING CAPACITY TEST: CPT | Performed by: INTERNAL MEDICINE

## 2024-03-14 PROCEDURE — RXMED WILLOW AMBULATORY MEDICATION CHARGE

## 2024-03-15 ENCOUNTER — PHARMACY VISIT (OUTPATIENT)
Dept: PHARMACY | Facility: CLINIC | Age: 64
End: 2024-03-15
Payer: MEDICAID

## 2024-03-20 DIAGNOSIS — K59.00 CONSTIPATION, UNSPECIFIED CONSTIPATION TYPE: ICD-10-CM

## 2024-03-21 PROCEDURE — RXMED WILLOW AMBULATORY MEDICATION CHARGE

## 2024-03-21 RX ORDER — BISACODYL 5 MG
TABLET, DELAYED RELEASE (ENTERIC COATED) ORAL
Qty: 30 TABLET | Refills: 3 | Status: SHIPPED | OUTPATIENT
Start: 2024-03-21 | End: 2025-03-21

## 2024-03-26 ENCOUNTER — PHARMACY VISIT (OUTPATIENT)
Dept: PHARMACY | Facility: CLINIC | Age: 64
End: 2024-03-26
Payer: MEDICAID

## 2024-03-28 LAB
MGC ASCENT PFT - FEV1 - PRE: 1.75
MGC ASCENT PFT - FEV1 - PREDICTED: 2.29
MGC ASCENT PFT - FVC - PRE: 2.52
MGC ASCENT PFT - FVC - PREDICTED: 2.91

## 2024-04-01 DIAGNOSIS — E78.5 DYSLIPIDEMIA: ICD-10-CM

## 2024-04-01 PROCEDURE — RXMED WILLOW AMBULATORY MEDICATION CHARGE

## 2024-04-02 ENCOUNTER — PHARMACY VISIT (OUTPATIENT)
Dept: PHARMACY | Facility: CLINIC | Age: 64
End: 2024-04-02
Payer: MEDICAID

## 2024-04-05 PROCEDURE — RXMED WILLOW AMBULATORY MEDICATION CHARGE

## 2024-04-05 RX ORDER — ATORVASTATIN CALCIUM 20 MG/1
20 TABLET, FILM COATED ORAL
Qty: 90 TABLET | Refills: 2 | Status: SHIPPED | OUTPATIENT
Start: 2024-04-05 | End: 2025-04-05

## 2024-04-09 ENCOUNTER — PHARMACY VISIT (OUTPATIENT)
Dept: PHARMACY | Facility: CLINIC | Age: 64
End: 2024-04-09
Payer: MEDICAID

## 2024-04-15 ENCOUNTER — PHARMACY VISIT (OUTPATIENT)
Dept: PHARMACY | Facility: CLINIC | Age: 64
End: 2024-04-15
Payer: MEDICAID

## 2024-04-15 PROCEDURE — RXMED WILLOW AMBULATORY MEDICATION CHARGE

## 2024-05-10 ENCOUNTER — LAB (OUTPATIENT)
Dept: LAB | Facility: LAB | Age: 64
End: 2024-05-10
Payer: MEDICAID

## 2024-05-10 ENCOUNTER — OFFICE VISIT (OUTPATIENT)
Dept: DERMATOLOGY | Facility: CLINIC | Age: 64
End: 2024-05-10
Payer: MEDICAID

## 2024-05-10 DIAGNOSIS — L29.9 PRURITUS: ICD-10-CM

## 2024-05-10 DIAGNOSIS — C73 FOLLICULAR CARCINOMA OF THYROID GLAND (MULTI): ICD-10-CM

## 2024-05-10 DIAGNOSIS — L29.9 PRURITUS: Primary | ICD-10-CM

## 2024-05-10 LAB
ALBUMIN SERPL BCP-MCNC: 4.2 G/DL (ref 3.4–5)
ALP SERPL-CCNC: 60 U/L (ref 33–136)
ALT SERPL W P-5'-P-CCNC: 14 U/L (ref 7–45)
ANION GAP SERPL CALC-SCNC: 13 MMOL/L (ref 10–20)
AST SERPL W P-5'-P-CCNC: 15 U/L (ref 9–39)
BASOPHILS # BLD AUTO: 0.03 X10*3/UL (ref 0–0.1)
BASOPHILS NFR BLD AUTO: 0.5 %
BILIRUB SERPL-MCNC: 0.6 MG/DL (ref 0–1.2)
BUN SERPL-MCNC: 10 MG/DL (ref 6–23)
CALCIUM SERPL-MCNC: 9.6 MG/DL (ref 8.6–10.6)
CHLORIDE SERPL-SCNC: 105 MMOL/L (ref 98–107)
CO2 SERPL-SCNC: 27 MMOL/L (ref 21–32)
CREAT SERPL-MCNC: 0.76 MG/DL (ref 0.5–1.05)
EGFRCR SERPLBLD CKD-EPI 2021: 87 ML/MIN/1.73M*2
EOSINOPHIL # BLD AUTO: 0.04 X10*3/UL (ref 0–0.7)
EOSINOPHIL NFR BLD AUTO: 0.6 %
ERYTHROCYTE [DISTWIDTH] IN BLOOD BY AUTOMATED COUNT: 17.7 % (ref 11.5–14.5)
GLUCOSE SERPL-MCNC: 96 MG/DL (ref 74–99)
HCT VFR BLD AUTO: 33.6 % (ref 36–46)
HGB BLD-MCNC: 11.8 G/DL (ref 12–16)
IMM GRANULOCYTES # BLD AUTO: 0.02 X10*3/UL (ref 0–0.7)
IMM GRANULOCYTES NFR BLD AUTO: 0.3 % (ref 0–0.9)
LYMPHOCYTES # BLD AUTO: 1.87 X10*3/UL (ref 1.2–4.8)
LYMPHOCYTES NFR BLD AUTO: 28.3 %
MCH RBC QN AUTO: 30.5 PG (ref 26–34)
MCHC RBC AUTO-ENTMCNC: 35.1 G/DL (ref 32–36)
MCV RBC AUTO: 87 FL (ref 80–100)
MONOCYTES # BLD AUTO: 0.51 X10*3/UL (ref 0.1–1)
MONOCYTES NFR BLD AUTO: 7.7 %
NEUTROPHILS # BLD AUTO: 4.14 X10*3/UL (ref 1.2–7.7)
NEUTROPHILS NFR BLD AUTO: 62.6 %
NRBC BLD-RTO: 0 /100 WBCS (ref 0–0)
PLATELET # BLD AUTO: 311 X10*3/UL (ref 150–450)
POTASSIUM SERPL-SCNC: 4.1 MMOL/L (ref 3.5–5.3)
PROT SERPL-MCNC: 6.8 G/DL (ref 6.4–8.2)
RBC # BLD AUTO: 3.87 X10*6/UL (ref 4–5.2)
SODIUM SERPL-SCNC: 141 MMOL/L (ref 136–145)
TSH SERPL-ACNC: 27.69 MIU/L (ref 0.44–3.98)
WBC # BLD AUTO: 6.6 X10*3/UL (ref 4.4–11.3)

## 2024-05-10 PROCEDURE — 85025 COMPLETE CBC W/AUTO DIFF WBC: CPT

## 2024-05-10 PROCEDURE — 36415 COLL VENOUS BLD VENIPUNCTURE: CPT

## 2024-05-10 PROCEDURE — 86800 THYROGLOBULIN ANTIBODY: CPT

## 2024-05-10 PROCEDURE — 99203 OFFICE O/P NEW LOW 30 MIN: CPT | Performed by: DERMATOLOGY

## 2024-05-10 PROCEDURE — RXMED WILLOW AMBULATORY MEDICATION CHARGE

## 2024-05-10 PROCEDURE — 1160F RVW MEDS BY RX/DR IN RCRD: CPT | Performed by: DERMATOLOGY

## 2024-05-10 PROCEDURE — 1159F MED LIST DOCD IN RCRD: CPT | Performed by: DERMATOLOGY

## 2024-05-10 PROCEDURE — 80053 COMPREHEN METABOLIC PANEL: CPT

## 2024-05-10 PROCEDURE — 84432 ASSAY OF THYROGLOBULIN: CPT

## 2024-05-10 PROCEDURE — 84443 ASSAY THYROID STIM HORMONE: CPT

## 2024-05-10 PROCEDURE — 4010F ACE/ARB THERAPY RXD/TAKEN: CPT | Performed by: DERMATOLOGY

## 2024-05-10 RX ORDER — CETIRIZINE HYDROCHLORIDE 10 MG/1
10 TABLET ORAL DAILY
Qty: 30 TABLET | Refills: 11 | Status: SHIPPED | OUTPATIENT
Start: 2024-05-10 | End: 2025-05-10

## 2024-05-10 RX ORDER — HYDROXYZINE HYDROCHLORIDE 25 MG/1
25 TABLET, FILM COATED ORAL NIGHTLY
Qty: 30 TABLET | Refills: 0 | Status: SHIPPED | OUTPATIENT
Start: 2024-05-10 | End: 2024-06-03 | Stop reason: SDUPTHER

## 2024-05-10 RX ORDER — HYDROXYZINE HYDROCHLORIDE 10 MG/1
10 TABLET, FILM COATED ORAL AS NEEDED
Qty: 30 TABLET | Refills: 0 | Status: SHIPPED | OUTPATIENT
Start: 2024-05-10 | End: 2024-06-03 | Stop reason: SDUPTHER

## 2024-05-10 NOTE — PROGRESS NOTES
Subjective     Katherine Sin is a 63 y.o. female who presents for complaints of diffuse pruritus and lesions on circumferential neck she would like to have removed.     Patients notes she has had pruritus diffusely for several years. She notes that the pruritus started on her arms and then spread to her back and legs. Has persisted since onset several years ago. Notes she take very hot showers for about 10-15 minutes at a time. Uses Vaseline but notes it doesn't seem to always help.     Review of Systems:  No other skin or systemic complaints other than what is documented elsewhere in the note.    The following portions of the chart were reviewed this encounter and updated as appropriate:         Skin Cancer History  No skin cancer on file.      Specialty Problems          Dermatology Problems    Chronic urticaria    Dermatosis papulosa nigra        Objective   Well appearing patient in no apparent distress; mood and affect are within normal limits.    A full examination was performed including scalp, head, eyes, ears, nose, lips, neck, chest, axillae, abdomen, back, buttocks, bilateral upper extremities, bilateral lower extremities, hands, feet, fingers, toes, fingernails, and toenails. All findings within normal limits unless otherwise noted below.    Assessment/Plan   1. Pruritus  Skin clear of any primary lesions but on arms and back there are small superficial excortications and noted xerosis    Diffuse pruritus  - Discussed with patient regarding bathing practices and contributing factors such as heat, frequency and soaps that can dry the skin out and cause irritation/pruritus  - Reviewed patient labwork. Most recent CMP was in 9/2023. Discussed with patient regarding certain underlying metabolic etiologies of pruritus including increased LFTs, BUN and anemia and need for lab work today.   - CBC, CMP order placed  - START hydroxyzine 25 mg to take once at night time. Reviewed R/B/E with patient regarding  antihistamines and drowsiness. Instructed not to take if planning to drive, operate machinery. Patient stated understanding.   - START hydroxyzine 10 mg once during the day PRN  - START cetirizine 10 mg daily  - RTC in 3 months for in-person visit        Related Procedures  CBC and Auto Differential  Comprehensive metabolic panel  Follow Up In Dermatology - Established Patient    Related Medications  cetirizine (ZyrTEC) 10 mg tablet  Take 1 tablet (10 mg) by mouth once daily.    hydrOXYzine HCL (Atarax) 25 mg tablet  Take 1 tablet (25 mg) by mouth once daily at bedtime.    hydrOXYzine HCL (Atarax) 10 mg tablet  Take 1 tablet (10 mg) by mouth if needed for itching. Can take 1 tablet during the day IF NEEDED for intense pruritus. Do not take if driving, cooking, or operating machinery.      Eric Machado MD  PGY3 Dermatology    I saw and evaluated the patient. I personally obtained the key and critical portions of the history and physical exam or was physically present for key and critical portions performed by the resident/fellow. I reviewed the resident/fellow's documentation and discussed the patient with the resident/fellow. I agree with the resident/fellow's medical decision making as documented in the note.    Alf Peoples MD PhD

## 2024-05-11 ENCOUNTER — PHARMACY VISIT (OUTPATIENT)
Dept: PHARMACY | Facility: CLINIC | Age: 64
End: 2024-05-11
Payer: MEDICAID

## 2024-05-12 LAB
BILL ONLY-THYROGLOBULIN: NORMAL
THYROGLOB AB SERPL-ACNC: <0.9 IU/ML (ref 0–4)
THYROGLOB SERPL-MCNC: 0.1 NG/ML (ref 1.3–31.8)
THYROGLOB SERPL-MCNC: ABNORMAL NG/ML (ref 1.3–31.8)

## 2024-05-20 NOTE — RESULT ENCOUNTER NOTE
Hi Dr. Zavala,    We had the privilege of seeing Katherine in clinic on 5/13/2024. We obtained some lab values to further evaluate her reported chronic pruritus. I wanted to attach you on her lab results, namely her CBC and normocytic anemia.

## 2024-05-21 ENCOUNTER — LAB (OUTPATIENT)
Dept: LAB | Facility: LAB | Age: 64
End: 2024-05-21
Payer: MEDICAID

## 2024-05-21 ENCOUNTER — OFFICE VISIT (OUTPATIENT)
Dept: PRIMARY CARE | Facility: CLINIC | Age: 64
End: 2024-05-21
Payer: MEDICAID

## 2024-05-21 VITALS
TEMPERATURE: 97.4 F | SYSTOLIC BLOOD PRESSURE: 134 MMHG | RESPIRATION RATE: 18 BRPM | HEART RATE: 61 BPM | BODY MASS INDEX: 31.33 KG/M2 | OXYGEN SATURATION: 91 % | WEIGHT: 183.5 LBS | DIASTOLIC BLOOD PRESSURE: 85 MMHG | HEIGHT: 64 IN

## 2024-05-21 DIAGNOSIS — Z23 ENCOUNTER FOR IMMUNIZATION: ICD-10-CM

## 2024-05-21 DIAGNOSIS — J43.9 PULMONARY EMPHYSEMA, UNSPECIFIED EMPHYSEMA TYPE (MULTI): ICD-10-CM

## 2024-05-21 DIAGNOSIS — Z00.00 ANNUAL PHYSICAL EXAM: ICD-10-CM

## 2024-05-21 DIAGNOSIS — Z12.31 ENCOUNTER FOR SCREENING MAMMOGRAM FOR MALIGNANT NEOPLASM OF BREAST: ICD-10-CM

## 2024-05-21 DIAGNOSIS — M85.89 OSTEOPENIA OF MULTIPLE SITES: ICD-10-CM

## 2024-05-21 DIAGNOSIS — E11.3213 TYPE 2 DIABETES MELLITUS WITH BOTH EYES AFFECTED BY MILD NONPROLIFERATIVE RETINOPATHY AND MACULAR EDEMA, WITHOUT LONG-TERM CURRENT USE OF INSULIN (MULTI): ICD-10-CM

## 2024-05-21 DIAGNOSIS — F17.210 CIGARETTE NICOTINE DEPENDENCE WITHOUT COMPLICATION: ICD-10-CM

## 2024-05-21 DIAGNOSIS — I10 PRIMARY HYPERTENSION: ICD-10-CM

## 2024-05-21 DIAGNOSIS — E11.9 TYPE 2 DIABETES MELLITUS WITHOUT COMPLICATION, WITHOUT LONG-TERM CURRENT USE OF INSULIN (MULTI): ICD-10-CM

## 2024-05-21 DIAGNOSIS — F10.10 EXCESSIVE DRINKING OF ALCOHOL: ICD-10-CM

## 2024-05-21 DIAGNOSIS — F10.10 ALCOHOL CONSUMPTION BINGE DRINKING: ICD-10-CM

## 2024-05-21 DIAGNOSIS — Z00.00 ANNUAL PHYSICAL EXAM: Primary | ICD-10-CM

## 2024-05-21 PROBLEM — H44.21: Status: ACTIVE | Noted: 2024-05-21

## 2024-05-21 PROBLEM — H33.199: Status: ACTIVE | Noted: 2024-05-21

## 2024-05-21 LAB
EST. AVERAGE GLUCOSE BLD GHB EST-MCNC: 123 MG/DL
FOLATE SERPL-MCNC: 12.4 NG/ML
HBA1C MFR BLD: 5.9 %
HCV AB SER QL: NONREACTIVE
VIT B12 SERPL-MCNC: 324 PG/ML (ref 211–911)

## 2024-05-21 PROCEDURE — 87340 HEPATITIS B SURFACE AG IA: CPT

## 2024-05-21 PROCEDURE — 90632 HEPA VACCINE ADULT IM: CPT | Performed by: STUDENT IN AN ORGANIZED HEALTH CARE EDUCATION/TRAINING PROGRAM

## 2024-05-21 PROCEDURE — 90743 HEPB VACC 2 DOSE ADOLESC IM: CPT | Performed by: STUDENT IN AN ORGANIZED HEALTH CARE EDUCATION/TRAINING PROGRAM

## 2024-05-21 PROCEDURE — 82607 VITAMIN B-12: CPT

## 2024-05-21 PROCEDURE — 83036 HEMOGLOBIN GLYCOSYLATED A1C: CPT

## 2024-05-21 PROCEDURE — 82746 ASSAY OF FOLIC ACID SERUM: CPT

## 2024-05-21 PROCEDURE — 3075F SYST BP GE 130 - 139MM HG: CPT | Performed by: STUDENT IN AN ORGANIZED HEALTH CARE EDUCATION/TRAINING PROGRAM

## 2024-05-21 PROCEDURE — 3044F HG A1C LEVEL LT 7.0%: CPT | Performed by: STUDENT IN AN ORGANIZED HEALTH CARE EDUCATION/TRAINING PROGRAM

## 2024-05-21 PROCEDURE — 90471 IMMUNIZATION ADMIN: CPT | Performed by: STUDENT IN AN ORGANIZED HEALTH CARE EDUCATION/TRAINING PROGRAM

## 2024-05-21 PROCEDURE — 99397 PER PM REEVAL EST PAT 65+ YR: CPT | Performed by: STUDENT IN AN ORGANIZED HEALTH CARE EDUCATION/TRAINING PROGRAM

## 2024-05-21 PROCEDURE — 86803 HEPATITIS C AB TEST: CPT

## 2024-05-21 PROCEDURE — 86706 HEP B SURFACE ANTIBODY: CPT

## 2024-05-21 PROCEDURE — 1159F MED LIST DOCD IN RCRD: CPT | Performed by: STUDENT IN AN ORGANIZED HEALTH CARE EDUCATION/TRAINING PROGRAM

## 2024-05-21 PROCEDURE — 90472 IMMUNIZATION ADMIN EACH ADD: CPT | Performed by: STUDENT IN AN ORGANIZED HEALTH CARE EDUCATION/TRAINING PROGRAM

## 2024-05-21 PROCEDURE — 1160F RVW MEDS BY RX/DR IN RCRD: CPT | Performed by: STUDENT IN AN ORGANIZED HEALTH CARE EDUCATION/TRAINING PROGRAM

## 2024-05-21 PROCEDURE — 86704 HEP B CORE ANTIBODY TOTAL: CPT

## 2024-05-21 PROCEDURE — 36415 COLL VENOUS BLD VENIPUNCTURE: CPT

## 2024-05-21 PROCEDURE — 3079F DIAST BP 80-89 MM HG: CPT | Performed by: STUDENT IN AN ORGANIZED HEALTH CARE EDUCATION/TRAINING PROGRAM

## 2024-05-21 PROCEDURE — 4010F ACE/ARB THERAPY RXD/TAKEN: CPT | Performed by: STUDENT IN AN ORGANIZED HEALTH CARE EDUCATION/TRAINING PROGRAM

## 2024-05-21 PROCEDURE — 1126F AMNT PAIN NOTED NONE PRSNT: CPT | Performed by: STUDENT IN AN ORGANIZED HEALTH CARE EDUCATION/TRAINING PROGRAM

## 2024-05-21 ASSESSMENT — ENCOUNTER SYMPTOMS
COUGH: 0
ABDOMINAL DISTENTION: 0
CHILLS: 0
RHINORRHEA: 0
BLOOD IN STOOL: 1
MYALGIAS: 0
POLYDIPSIA: 0
DIAPHORESIS: 1
SORE THROAT: 0
LOSS OF SENSATION IN FEET: 0
FATIGUE: 0
HEMATURIA: 0
FREQUENCY: 0
EYE PAIN: 0
FEVER: 0
SINUS PRESSURE: 0
DIFFICULTY URINATING: 0
WHEEZING: 0
WOUND: 0
PALPITATIONS: 0
LIGHT-HEADEDNESS: 0
ABDOMINAL PAIN: 0
NERVOUS/ANXIOUS: 0
CONSTIPATION: 0
CHEST TIGHTNESS: 0
NUMBNESS: 1
DYSPHORIC MOOD: 0
VOMITING: 0
SHORTNESS OF BREATH: 0
TREMORS: 0
SINUS PAIN: 0
DEPRESSION: 0
APPETITE CHANGE: 0
EYE DISCHARGE: 0
HEADACHES: 0
JOINT SWELLING: 0
DIARRHEA: 0
DIZZINESS: 0
UNEXPECTED WEIGHT CHANGE: 0
NAUSEA: 0
WEAKNESS: 0
ADENOPATHY: 0
OCCASIONAL FEELINGS OF UNSTEADINESS: 0
SLEEP DISTURBANCE: 0
BRUISES/BLEEDS EASILY: 0
ARTHRALGIAS: 0
BACK PAIN: 1

## 2024-05-21 ASSESSMENT — PATIENT HEALTH QUESTIONNAIRE - PHQ9
2. FEELING DOWN, DEPRESSED OR HOPELESS: NOT AT ALL
1. LITTLE INTEREST OR PLEASURE IN DOING THINGS: NOT AT ALL
SUM OF ALL RESPONSES TO PHQ9 QUESTIONS 1 AND 2: 0

## 2024-05-21 ASSESSMENT — PAIN SCALES - GENERAL: PAINLEVEL: 0-NO PAIN

## 2024-05-21 ASSESSMENT — COLUMBIA-SUICIDE SEVERITY RATING SCALE - C-SSRS
2. HAVE YOU ACTUALLY HAD ANY THOUGHTS OF KILLING YOURSELF?: NO
6. HAVE YOU EVER DONE ANYTHING, STARTED TO DO ANYTHING, OR PREPARED TO DO ANYTHING TO END YOUR LIFE?: NO
1. IN THE PAST MONTH, HAVE YOU WISHED YOU WERE DEAD OR WISHED YOU COULD GO TO SLEEP AND NOT WAKE UP?: NO

## 2024-05-21 NOTE — ASSESSMENT & PLAN NOTE
- Flu vaccine: recommended annually  - COVID vaccine: recommended completion of primary series and recommended boosters  - Prevnar (PCV20):  received PCV20 in 02/2024  - Tdap: next due 2030  - Shingrix (50+): done in 2019  - Lipid Panel (35M,45F): last 8/2023, normal  - Food Insecurity screen: neg  - Depression: PHQ-2 neg  - Tobacco Cessation: not interested now, gave quit line info if needed  - Alcohol Cessation/reduction: see separate problem  - Last Dental: recommended follow up every 6mo; last dental visit 3-4 months ago   - Last Eye exam: recommended follow up annually; last ophtho visit oct 2023  - Colonoscopy (45-75): done in 2021; due in 2031  - Lung CA screening (50-80, >20py): done in August 2023 to follow for thyroid; repeat August 2024  - Pap smear (21-65): last pap with cotesting done in 2023, normal; likely does not need further paps  - Osteoporosis (65+F): DEXA done in 2024 w/ osteopenia, now on calcium+vitamin D, due in 2026  - Breast CA screening: done in May 2023 and normal, due now; ordered  - Hep A and B vaccine series initiated today; will need to complete series  - Lifetime HIV, syph, HepC: due; ordered HepC, discuss syphilis and HIV next visit  - RSV recommended

## 2024-05-21 NOTE — PROGRESS NOTES
Subjective   Patient ID: Katherine Sin is a 63 y.o. female with PMH of osteopenia, DM, HTN, and hypothyroidism post thyroidectomy who presents for Follow-up (3 month f/u).    #HTN  -BP /85  -current regimen is amlodipine 10 mg daily, Lasix 40 mg BID, lisinopril 40 mg daily    #DM  -following with endo  -reports some numbness and tingling in hands and feet  -current regimen is metformin 1000 mg BID    # Health Maintenance  - Last prior HM: n/a  - Patient's rating of their own health: Good  - Dental Care: last prior dental visit 3-4 months ago // brushes teeth // doesn't floss teeth  // denies current tooth pain  - Vision: last prior ophtho visit oct 2023 // corrective devices: reading glasses // recent vision: good, no floaters/changes in visual fields  - Hearing: denies recent hearing loss   - Diet: good appetite, lots of veggies, not much fruit; 2-3 bottles of water daily  - Exercise: walking 1x/day  - Weight: stable, 183lbs, BMI 31.5 kg/m2  - Smoking: current smoker 1/2pack/day, since 13yo (trying to slowly decrease how much she smokes, feels unready to fully quit now)  - EtOH: Alcohol Use: Yes, patient drinks alcohol. Drinks almost daily; 48oz sometimes more/less beer. Used to drink more. Pt says she is not an alcoholic and just drinks because she likes it. Does not identify any downsides and denies any history of withdrawal.  - Illicit substances: Past cocaine. 3-4 yrs of almost daily cocaine use, stopped 13yrs ago   - Employment: retired in her 50s  - Living Situation: lives alone, apt w/ elevator, no stairs inside  - Colon CA: last prior screening Colonoscopy in 2021 showed diverticulosis, hemorrhoids, and a hyperplastic polyp  // family h/o colon ca? No    WOMEN  - Menstrual Status: Post-Menopausal  - LMP 48 yo  - Pregnancy history:   - Sexual History: Partner(s) current:  0 , not planning on being sexually active   - Bladder dysfunction? has not had any episodes of incontinence  -  Cervical CA: last prior pap oct 2023 // h/o abnormal pap? Yes, describe: normal for past 10 yrs  - Breast CA: last prior mammo may 2023 // h/o abnormal mammo? No    PHQ2:  Over the past 2 weeks, how often have you been bothered by any of the following problems?  Little interest or pleasure in doing things: Not at all  Feeling down, depressed, or hopeless: Not at all    Food insecurity:  Food Insecurity: No Food Insecurity (3/4/2024)    Hunger Vital Sign     Worried About Running Out of Food in the Last Year: Never true     Ran Out of Food in the Last Year: Never true     Review of Systems   Constitutional:  Positive for diaphoresis. Negative for appetite change, chills, fatigue, fever and unexpected weight change.        Sweats at night: sometimes changes clothes, never soaks sheets, wakes her up at night, usually after drinking, past 2 months but happened last summer as well   HENT:  Negative for congestion, ear pain, hearing loss, rhinorrhea, sinus pressure, sinus pain, sneezing and sore throat.    Eyes:  Negative for pain, discharge and visual disturbance.   Respiratory:  Negative for cough, chest tightness, shortness of breath and wheezing.    Cardiovascular:  Negative for chest pain, palpitations and leg swelling.   Gastrointestinal:  Positive for blood in stool. Negative for abdominal distention, abdominal pain, constipation, diarrhea, nausea and vomiting.        Small streaks of blood on TP d/t hemorrhoids, no tarry stools or blood in stool    Endocrine: Negative for cold intolerance, heat intolerance, polydipsia and polyuria.   Genitourinary:  Negative for difficulty urinating, frequency, hematuria, urgency, vaginal bleeding and vaginal discharge.   Musculoskeletal:  Positive for back pain. Negative for arthralgias, gait problem, joint swelling and myalgias.        Chronic back pain for multiple years; had a surgery to remove L5, also has OA   Skin:  Negative for rash and wound.   Neurological:  Positive for  "numbness. Negative for dizziness, tremors, syncope, weakness, light-headedness and headaches.        Pins and needles and numbness in feet and hands; lasts a few seconds, happens 1-2x/week   Hematological:  Negative for adenopathy. Does not bruise/bleed easily.   Psychiatric/Behavioral:  Negative for dysphoric mood, self-injury, sleep disturbance and suicidal ideas. The patient is not nervous/anxious.      Objective   /85 (BP Location: Right arm, Patient Position: Sitting, BP Cuff Size: Adult)   Pulse 61   Temp 36.3 °C (97.4 °F) (Temporal)   Resp 18   Ht 1.626 m (5' 4\")   Wt 83.2 kg (183 lb 8 oz)   SpO2 91%   BMI 31.50 kg/m²  Body mass index is 31.5 kg/m².    Physical Exam  Constitutional:       Appearance: Normal appearance.   HENT:      Head: Normocephalic.   Cardiovascular:      Rate and Rhythm: Normal rate and regular rhythm.      Pulses: Normal pulses.      Heart sounds: Normal heart sounds. No murmur heard.  Pulmonary:      Effort: Pulmonary effort is normal.      Breath sounds: Normal breath sounds. No wheezing, rhonchi or rales.   Abdominal:      General: Abdomen is flat. Bowel sounds are normal.      Palpations: Abdomen is soft.   Musculoskeletal:         General: Normal range of motion.   Neurological:      General: No focal deficit present.      Mental Status: She is alert and oriented to person, place, and time.      Sensory: No sensory deficit.      Motor: No weakness.      Gait: Gait normal.   Psychiatric:         Mood and Affect: Mood normal.         Behavior: Behavior normal.       Assessment/Plan   Katherine Sin is a 63 y.o. female with PMH of osteopenia, DM, HTN, and hypothyroidism post thyroidectomy who presents for Follow-up (3 month f/u). Patient has not yet signed up for Medicare but is planning to an will contact the clinic if she needs assistance.    Problem List Items Addressed This Visit             ICD-10-CM    Emphysema lung (Multi) J43.9     -mild emphysematous changes " noted on prior chest imaging without significant associated respiratory symptoms  -continue albuterol inhaler PRN  -smoking cessation counseling         Nicotine dependence F17.200    Hypertension I10     -BP /85, mildly above goal of <130/80  -c/w current regimen (amlodipine 10 mg daily, Lasix 40 mg BID, lisinopril 40 mg daily)  -consider adjustment of diuretic dosing at future visit if blood pressure remains elevated  -continue discussing alcohol and tobacco use reduction/cessation which will likely benefit blood pressure         Excessive drinking of alcohol F10.10     -currently 48 oz of beer almost daily, sometimes less, sometimes more (up to 1 case)  -not interested in reducing intake and denies having a problem with alcohol  -pt educated to go to ED if she experiences signs or symptoms of withdrawal  -normal LFTs 9/23  -ordered CBC, B12, and folate levels  -provided education regarding risky drinking         Relevant Orders    Vitamin B12 (Completed)    Folate (Completed)    Osteopenia of multiple sites M85.89     -diagnosed on recent DEXA  -continue vitamin D and calcium supplementation as prescribed         Annual physical exam Z00.00     - Flu vaccine: recommended annually  - COVID vaccine: recommended completion of primary series and recommended boosters  - Prevnar (PCV20):  received PCV20 in 02/2024  - Tdap: next due 2030  - Shingrix (50+): done in 2019  - Lipid Panel (35M,45F): last 8/2023, normal  - Food Insecurity screen: neg  - Depression: PHQ-2 neg  - Tobacco Cessation: not interested now, gave quit line info if needed  - Alcohol Cessation/reduction: see separate problem  - Last Dental: recommended follow up every 6mo; last dental visit 3-4 months ago   - Last Eye exam: recommended follow up annually; last ophtho visit oct 2023  - Colonoscopy (45-75): done in 2021; due in 2031  - Lung CA screening (50-80, >20py): done in August 2023 to follow for thyroid; repeat August 2024  - Pap smear  (21-65): last pap with cotesting done in 2023, normal; likely does not need further paps  - Osteoporosis (65+F): DEXA done in 2024 w/ osteopenia, now on calcium+vitamin D, due in 2026  - Breast CA screening: done in May 2023 and normal, due now; ordered  - Hep A and B vaccine series initiated today; will need to complete series  - Lifetime HIV, syph, HepC: due; ordered HepC, discuss syphilis and HIV next visit  - RSV recommended         Relevant Orders    Hepatitis C Antibody (Completed)    Type 2 diabetes mellitus with both eyes affected by mild nonproliferative retinopathy and macular edema, without long-term current use of insulin (Multi) E11.3213     -reports some numbness and tingling in hands and feet, likely c/w diabetic neuropathy  -c/w current regimen (metformin 1000 mg BID)  -DM foot exam: has podiatry apt in July  -saw ophthalmology in December '23  -endo appt in June  -last A1C 5 mos ago: 6.0%; at goal of <7.0%  -ordered repeat HbA1c         Relevant Orders    Hemoglobin A1C (Completed)     Other Visit Diagnoses         Codes    Encounter for screening mammogram for malignant neoplasm of breast    -  Primary Z12.31    Relevant Orders    BI mammo bilateral screening tomosynthesis    Encounter for immunization     Z23    -HBV and HAV vaccines given today; due for 2nd HAV in November and HBV in 1 month     Relevant Orders    Hepatitis A vaccine, age 19 years and greater (HAVRIX) (Completed)    Hepatitis B vaccine, 20 yrs and older (RECOMBIVAX, ENGERIX) (Completed)        FU in 3 months for reassessment of chronic medical conditions, including substance use.    Patient discussed with Dr. Antunez.    -Jessica Boyd, MS3    Patient seen and evaluated with medical student. Agree with assessment above, edits made within text.     Fortunato Whiting MD  FM PGY-3

## 2024-05-21 NOTE — PATIENT INSTRUCTIONS
Dear Ms. Sin,    It was a pleasure getting to manage your care with you today.     Please follow up with the pharmacy to get your RSV vaccine.    If you have any difficulty signing up for Medicare, please give our clinic a call and our patient care navigator can assist you.    Labs:  We ordered Labs for you at the  Labs.     Directions to  Labs:   1. You can find them as you exit our clinic walk past the coffee shop.   2. Turn Right and walk to the end of the nava (you will see a staircase).   3. When you arrive at the end of the nava, turn left and walk down the hallway with skylights.   4. Half way down the yoana light hallway you will see the lab on your left    Follow up Appointment: 3 months for reassessment of chronic medical conditions    Emergency  In the case of an emergency please call 911 or visit the Emergency Department immediately for evaluation.     We look forward to continuing your care here at our Clinic. Take Care.     Sincerely,   Fortunato Whiting MD

## 2024-05-22 DIAGNOSIS — F10.10 ALCOHOL CONSUMPTION BINGE DRINKING: Primary | ICD-10-CM

## 2024-05-22 DIAGNOSIS — Z00.00 ANNUAL PHYSICAL EXAM: ICD-10-CM

## 2024-05-22 LAB
HBV CORE AB SER QL: NONREACTIVE
HBV SURFACE AB SER-ACNC: <3.1 MIU/ML
HBV SURFACE AG SERPL QL IA: NONREACTIVE

## 2024-05-23 PROBLEM — R87.610 PAP SMEAR ABNORMALITY OF CERVIX WITH ASCUS FAVORING BENIGN: Status: RESOLVED | Noted: 2023-10-20 | Resolved: 2024-05-23

## 2024-05-23 PROBLEM — E11.3213 TYPE 2 DIABETES MELLITUS WITH BOTH EYES AFFECTED BY MILD NONPROLIFERATIVE RETINOPATHY AND MACULAR EDEMA, WITHOUT LONG-TERM CURRENT USE OF INSULIN (MULTI): Status: ACTIVE | Noted: 2024-05-23

## 2024-05-23 PROBLEM — E11.3213 TYPE 2 DIABETES MELLITUS WITH BOTH EYES AFFECTED BY MILD NONPROLIFERATIVE RETINOPATHY AND MACULAR EDEMA, WITHOUT LONG-TERM CURRENT USE OF INSULIN (MULTI): Status: ACTIVE | Noted: 2023-06-13

## 2024-05-23 NOTE — ASSESSMENT & PLAN NOTE
-mild emphysematous changes noted on prior chest imaging without significant associated respiratory symptoms  -continue albuterol inhaler PRN  -smoking cessation counseling

## 2024-05-23 NOTE — ASSESSMENT & PLAN NOTE
-reports some numbness and tingling in hands and feet, likely c/w diabetic neuropathy  -c/w current regimen (metformin 1000 mg BID)  -DM foot exam: has podiatry apt in July  -saw ophthalmology in December '23  -endo appt in June  -last A1C 5 mos ago: 6.0%; at goal of <7.0%  -ordered repeat HbA1c

## 2024-05-23 NOTE — ASSESSMENT & PLAN NOTE
-currently 48 oz of beer almost daily, sometimes less, sometimes more (up to 1 case)  -not interested in reducing intake and denies having a problem with alcohol  -pt educated to go to ED if she experiences signs or symptoms of withdrawal  -normal LFTs 9/23  -ordered CBC, B12, and folate levels  -provided education regarding risky drinking

## 2024-05-23 NOTE — ASSESSMENT & PLAN NOTE
-BP /85, mildly above goal of <130/80  -c/w current regimen (amlodipine 10 mg daily, Lasix 40 mg BID, lisinopril 40 mg daily)  -consider adjustment of diuretic dosing at future visit if blood pressure remains elevated  -continue discussing alcohol and tobacco use reduction/cessation which will likely benefit blood pressure

## 2024-05-26 NOTE — PROGRESS NOTES
I reviewed the resident/fellow's documentation and discussed the patient with the resident/fellow. I agree with the resident/fellow's medical decision making as documented in the note.     Mallika Antunez MD

## 2024-05-29 NOTE — PROGRESS NOTES
"FUV for thyroid cancer. LV with me 2023.    History of Present Illness  63 y.o. female with hx of PTC s/p total thyroidectomy (2011) and GRANADOS (2012), post-surgical hypothyroidism, and type 2 diabetes.    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, +angiolymph invasion (*per previous provider notes; pathology report unavailable)  2012: GRANADOS (100 mCi)   Stimulated T.4 ()    WBS: uptake in thyroid bed only  2014: Stimulated Tg 36, Tg< 0.2  2020: WBS-faint focus in left thyroid bed  2023: CT neck wo contrast: no cervical lymphadenopathy    Current regimen: levothyroxine 100 mcg, 7.5 tabs/week      ROS  General: no fever or chills  CV: no chest pain   Respiratory: no shortness of breath  MSK: no lower extremity edema  Neuro: no headache or dizziness  See HPI for Endocrine ROS    Physical Exam   height is 1.638 m (5' 4.5\") and weight is 79.8 kg (176 lb). Her blood pressure is 122/86 and her pulse is 67.   General: not in acute distress  HEENT: ALISHA ESCOBAR  Thyroid: s/p total thyroidectomy, left thyroid bed lesion, 2 cm, mobile, soft  Neuro: alert and oriented x 3    Current Outpatient Medications   Medication Sig Dispense Refill    albuterol 90 mcg/actuation inhaler Inhale.      amLODIPine (Norvasc) 10 mg tablet Take 1 tablet (10 mg) by mouth once daily. 90 tablet 3    atorvastatin (Lipitor) 20 mg tablet TAKE 1 TABLET (20 MG) BY MOUTH ONCE DAILY. 90 tablet 2    benzalkonium chloride 0.13 % towelette Please check blood pressure daily and documen numbers into a journal. Please bring journal into clinic whenever you see your doctor.      bisacodyl (Dulcolax) 5 mg EC tablet TAKE 1 TABLET BY MOUTH ONCE DAILY AS NEEDED FOR CONSTIPATION 30 tablet 3    calcium carbonate 600 mg calcium (1,500 mg) tablet Take 0.5 tablets (750 mg) by mouth 2 times a day with meals. 90 tablet 3    cetirizine (ZyrTEC) 10 mg tablet Take 1 tablet " (10 mg) by mouth once daily. 30 tablet 11    cholecalciferol (Vitamin D-3) 50 MCG (2000 UT) tablet Take 1 tablet (50 mcg) by mouth once daily. 90 tablet 3    docusate sodium (Colace) 100 mg capsule TAKE 1 CAPSULE BY MOUTH DAILY 60 capsule 1    fluticasone (Flonase) 50 mcg/actuation nasal spray Administer into affected nostril(s).      furosemide (Lasix) 40 mg tablet Take 1 tablet (40 mg) by mouth 2 times a day.      gabapentin (Neurontin) 300 mg capsule Take 1 capsule (300 mg) by mouth once daily at bedtime. 30 capsule 0    hydroxyurea (Hydrea) 500 mg capsule 3 Tablet      hydrOXYzine HCL (Atarax) 10 mg tablet Take 1 tablet (10 mg) by mouth if needed for itching. Can take 1 tablet during the day IF NEEDED for intense pruritus. Do not take if driving, cooking, or operating machinery. 30 tablet 0    hydrOXYzine HCL (Atarax) 25 mg tablet Take 1 tablet (25 mg) by mouth once daily at bedtime. 30 tablet 0    levothyroxine (Synthroid, Levoxyl) 100 mcg tablet Take 1 tablet Mon-Sat, take 1.5 tablets on Sun (first thing in the morning on an empty stomach with water, wait 30 mins before eating/drinking/taking other meds). 96 tablet 3    lisinopril 40 mg tablet TAKE 1 TABLET BY MOUTH ONCE DAILY AS DIRECTED 90 tablet 3    metFORMIN (Glucophage) 1,000 mg tablet TAKE 1 TABLET BY MOUTH TWO TIMES A DAY WITH MEALS 180 tablet 3     No current facility-administered medications for this visit.       Assessment and Plan  63 y.o. female with hx of PTC s/p total thyroidectomy (2011) and GRANADOS (2012), post-surgical hypothyroidism, and type 2 diabetes.    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, +angiolymph invasion (*per previous provider notes; pathology report unavailable)  2012: GRANADOS (100 mCi)   Stimulated T.4 ()    WBS: uptake in thyroid bed only  2014: Stimulated Tg 36, Tg< 0.2  2020:    Stimulated Tg:  0.3 ()   WBS-faint focus in left thyroid bed  08/2023:     CT neck wo contrast: no cervical lymphadenopathy    CT chest: subcentimeter pulmonary nodules stable since multiple prior exams    Current regimen: levothyroxine 100 mcg, 7.5 tabs/week (since 11/2023)    Labs from 01/2024  TSH 0.8    Labs from 05/2024  TSH 28  Tg 0.1  Tg Ab ng    Tg has been undetectable or low at 0.1 since 2017.  No structural disease on imaging but CT neck wo contrast is not ideal for lymph node evaluation.   There is a palpable lesion in the left thyroid bed area today, about 2 cm. This was noted on a US neck in 2021 (1.1 cm, residual tissue vs LN). She was sent for FNA but the lesion was smaller (0.7 cm) and too  mobile with respiration and was deemed not amenable for biopsy.  Given the stability of Tg, this is likely residual tissue, but will obtain US neck now to evaluate.    TSH 28 from 0.8 in January.  Calcium was started by PCP in January.  She sometimes take levothyroxine around 9 am (still empty stomach) but eats around 11 am and takes the calcium then.  Advised to take calcium at least 4 hours after levothyroxine.  Will repeat TSH in 2 months. If TSH still above goal, will increase levothyroxine at that time.    PLAN:  -check TSH in 2 months  -check TSH, Tg, Tg Ab before next visit   -obtain US neck  -TSH goal 0.5-2.0  -diabetes managed by PCP    Will call with results.  Follow-up in 6 months (labs prior)

## 2024-06-03 ENCOUNTER — OFFICE VISIT (OUTPATIENT)
Dept: ENDOCRINOLOGY | Facility: CLINIC | Age: 64
End: 2024-06-03
Payer: MEDICAID

## 2024-06-03 VITALS
SYSTOLIC BLOOD PRESSURE: 122 MMHG | DIASTOLIC BLOOD PRESSURE: 86 MMHG | HEART RATE: 67 BPM | BODY MASS INDEX: 29.32 KG/M2 | HEIGHT: 65 IN | WEIGHT: 176 LBS

## 2024-06-03 DIAGNOSIS — L29.9 PRURITUS: ICD-10-CM

## 2024-06-03 DIAGNOSIS — C73 FOLLICULAR CARCINOMA OF THYROID GLAND (MULTI): ICD-10-CM

## 2024-06-03 DIAGNOSIS — E89.0 HYPOTHYROIDISM, POSTSURGICAL: Primary | ICD-10-CM

## 2024-06-03 PROCEDURE — 3044F HG A1C LEVEL LT 7.0%: CPT | Performed by: STUDENT IN AN ORGANIZED HEALTH CARE EDUCATION/TRAINING PROGRAM

## 2024-06-03 PROCEDURE — RXMED WILLOW AMBULATORY MEDICATION CHARGE

## 2024-06-03 PROCEDURE — 99215 OFFICE O/P EST HI 40 MIN: CPT | Performed by: STUDENT IN AN ORGANIZED HEALTH CARE EDUCATION/TRAINING PROGRAM

## 2024-06-03 PROCEDURE — 1126F AMNT PAIN NOTED NONE PRSNT: CPT | Performed by: STUDENT IN AN ORGANIZED HEALTH CARE EDUCATION/TRAINING PROGRAM

## 2024-06-03 PROCEDURE — 1159F MED LIST DOCD IN RCRD: CPT | Performed by: STUDENT IN AN ORGANIZED HEALTH CARE EDUCATION/TRAINING PROGRAM

## 2024-06-03 PROCEDURE — 4010F ACE/ARB THERAPY RXD/TAKEN: CPT | Performed by: STUDENT IN AN ORGANIZED HEALTH CARE EDUCATION/TRAINING PROGRAM

## 2024-06-03 PROCEDURE — 3074F SYST BP LT 130 MM HG: CPT | Performed by: STUDENT IN AN ORGANIZED HEALTH CARE EDUCATION/TRAINING PROGRAM

## 2024-06-03 PROCEDURE — 3079F DIAST BP 80-89 MM HG: CPT | Performed by: STUDENT IN AN ORGANIZED HEALTH CARE EDUCATION/TRAINING PROGRAM

## 2024-06-03 ASSESSMENT — PAIN SCALES - GENERAL: PAINLEVEL: 0-NO PAIN

## 2024-06-03 NOTE — PATIENT INSTRUCTIONS
Please have blood work for your thyroid done in early August    2.   Please have blood work done about 1 week before your next appointment with me     3.   Schedule neck ultrasound Radiology scheduling: #[phone]

## 2024-06-05 ENCOUNTER — PHARMACY VISIT (OUTPATIENT)
Dept: PHARMACY | Facility: CLINIC | Age: 64
End: 2024-06-05
Payer: MEDICAID

## 2024-06-05 RX ORDER — HYDROXYZINE HYDROCHLORIDE 25 MG/1
25 TABLET, FILM COATED ORAL NIGHTLY
Qty: 30 TABLET | Refills: 0 | Status: SHIPPED | OUTPATIENT
Start: 2024-06-05 | End: 2024-07-05

## 2024-06-05 RX ORDER — HYDROXYZINE HYDROCHLORIDE 10 MG/1
10 TABLET, FILM COATED ORAL AS NEEDED
Qty: 30 TABLET | Refills: 0 | Status: SHIPPED | OUTPATIENT
Start: 2024-06-05

## 2024-06-17 ENCOUNTER — APPOINTMENT (OUTPATIENT)
Dept: ENDOCRINOLOGY | Facility: CLINIC | Age: 64
End: 2024-06-17
Payer: MEDICAID

## 2024-06-17 PROCEDURE — RXMED WILLOW AMBULATORY MEDICATION CHARGE

## 2024-06-20 ENCOUNTER — PHARMACY VISIT (OUTPATIENT)
Dept: PHARMACY | Facility: CLINIC | Age: 64
End: 2024-06-20
Payer: MEDICAID

## 2024-06-24 PROCEDURE — RXMED WILLOW AMBULATORY MEDICATION CHARGE

## 2024-06-27 ENCOUNTER — PHARMACY VISIT (OUTPATIENT)
Dept: PHARMACY | Facility: CLINIC | Age: 64
End: 2024-06-27
Payer: MEDICAID

## 2024-07-03 PROCEDURE — RXMED WILLOW AMBULATORY MEDICATION CHARGE

## 2024-07-08 PROCEDURE — RXMED WILLOW AMBULATORY MEDICATION CHARGE

## 2024-07-09 ENCOUNTER — PHARMACY VISIT (OUTPATIENT)
Dept: PHARMACY | Facility: CLINIC | Age: 64
End: 2024-07-09
Payer: MEDICAID

## 2024-07-10 ENCOUNTER — PHARMACY VISIT (OUTPATIENT)
Dept: PHARMACY | Facility: CLINIC | Age: 64
End: 2024-07-10
Payer: MEDICAID

## 2024-07-15 ENCOUNTER — APPOINTMENT (OUTPATIENT)
Dept: PODIATRY | Facility: CLINIC | Age: 64
End: 2024-07-15
Payer: MEDICAID

## 2024-07-15 DIAGNOSIS — M79.675 TOE PAIN, LEFT: ICD-10-CM

## 2024-07-15 DIAGNOSIS — M79.674 GREAT TOE PAIN, RIGHT: ICD-10-CM

## 2024-07-15 DIAGNOSIS — I73.9 PERIPHERAL VASCULAR DISEASE (CMS-HCC): Primary | ICD-10-CM

## 2024-07-15 DIAGNOSIS — B35.1 ONYCHOMYCOSIS: ICD-10-CM

## 2024-07-15 PROCEDURE — 3044F HG A1C LEVEL LT 7.0%: CPT | Performed by: PODIATRIST

## 2024-07-15 PROCEDURE — 99203 OFFICE O/P NEW LOW 30 MIN: CPT | Performed by: PODIATRIST

## 2024-07-15 PROCEDURE — 1159F MED LIST DOCD IN RCRD: CPT | Performed by: PODIATRIST

## 2024-07-15 PROCEDURE — 1160F RVW MEDS BY RX/DR IN RCRD: CPT | Performed by: PODIATRIST

## 2024-07-15 PROCEDURE — 4010F ACE/ARB THERAPY RXD/TAKEN: CPT | Performed by: PODIATRIST

## 2024-07-15 NOTE — PROGRESS NOTES
This is a 63 y.o. female new patient for foot pain    History of Present Illness:   Patient states they are here for foot pain  Referred by pcp  Denies trauma  Noticed swelling in legs  Thick nails  No other pedal complaints    Past Medical History  Past Medical History:   Diagnosis Date    Age-related nuclear cataract, left eye 10/12/2016    Age-related nuclear cataract of left eye    Age-related nuclear cataract, right eye 10/12/2016    Age-related nuclear cataract of right eye    Bradycardia     Congestive heart failure (CHF) (Multi)     COPD (chronic obstructive pulmonary disease) (Multi)     Diabetes mellitus (Multi)     Hyperlipidemia     Hypertension     Hypothyroidism     Meibomian gland dysfunction of unspecified eye, unspecified eyelid 05/30/2014    MGD (meibomian gland dysfunction)    Menopausal and female climacteric states 06/11/2015    Menopausal symptoms    Myopia, unspecified eye 05/30/2014    Myopia with presbyopia    Pap smear abnormality of cervix with ASCUS favoring benign 10/20/2023    Personal history of malignant neoplasm of thyroid 06/12/2015    History of thyroid cancer    Pulmonary nodules     Round hole, left eye 10/31/2016    Round hole of left retina without detachment    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    Review Of Systems:  GENERAL: No weight loss, malaise or fevers.  HEENT: Negative for frequent or significant headaches,   RESPIRATORY: Negative for cough, wheezing or shortness of breath.  CARDIOVASCULAR: Negative for chest pain, leg swelling or palpitations.    Physical Exam:  Patient is a pleasant, cooperative, well developed 63 y.o.  adult female. The patient is alert and oriented to time, place and person.   Patient has normal affect and mood.    Examination of Both Lower Extremities:   Objective:   Vasc: DP and PT pulses are palpable bilateral.  CFT is less than 3 seconds bilateral.  Skin temperature is  warm to cool proximal to distal bilateral.  No hair growth noted. Edema noted to bl LE    Neuro: Vibratory, light touch and proprioception are intact bilateral.      Derm: Nails 1-5 bilateral are intact.  Skin is supple with normal texture and turgor noted.  Webspaces are clean, dry and intact bilateral.  There are no hyperkeratoses, ulcerations, verruca or other lesions noted.      Ortho: Muscle strength is 5/5 for all pedal groups tested.      1. Peripheral vascular disease (CMS-HCC)        2. Onychomycosis        3. Toe pain, left        4. Great toe pain, right            Patient exam and eval  Nails debrided  Keep filed down  Recommend compression socks  Patient to follow up in 6 mos or sooner if any problems arise.   Patient was in agreement to this plan. All questions answered.      Cindy Meyers DPM  [phone]  [facility] [address]  Fax: [phone]

## 2024-08-01 ENCOUNTER — HOSPITAL ENCOUNTER (OUTPATIENT)
Dept: RADIOLOGY | Facility: HOSPITAL | Age: 64
Discharge: HOME | End: 2024-08-01
Payer: MEDICAID

## 2024-08-01 DIAGNOSIS — C73 FOLLICULAR CARCINOMA OF THYROID GLAND (MULTI): ICD-10-CM

## 2024-08-01 PROCEDURE — 76536 US EXAM OF HEAD AND NECK: CPT

## 2024-08-01 PROCEDURE — 76536 US EXAM OF HEAD AND NECK: CPT | Performed by: STUDENT IN AN ORGANIZED HEALTH CARE EDUCATION/TRAINING PROGRAM

## 2024-08-02 PROCEDURE — RXMED WILLOW AMBULATORY MEDICATION CHARGE

## 2024-08-03 ENCOUNTER — PHARMACY VISIT (OUTPATIENT)
Dept: PHARMACY | Facility: CLINIC | Age: 64
End: 2024-08-03
Payer: MEDICAID

## 2024-08-07 ENCOUNTER — TELEPHONE (OUTPATIENT)
Dept: PRIMARY CARE | Facility: CLINIC | Age: 64
End: 2024-08-07

## 2024-08-07 ENCOUNTER — LAB (OUTPATIENT)
Dept: LAB | Facility: LAB | Age: 64
End: 2024-08-07
Payer: MEDICAID

## 2024-08-07 ENCOUNTER — HOSPITAL ENCOUNTER (OUTPATIENT)
Dept: RADIOLOGY | Facility: HOSPITAL | Age: 64
Discharge: HOME | End: 2024-08-07
Payer: MEDICAID

## 2024-08-07 VITALS — BODY MASS INDEX: 30.05 KG/M2 | HEIGHT: 64 IN | WEIGHT: 176 LBS

## 2024-08-07 DIAGNOSIS — Z12.31 ENCOUNTER FOR SCREENING MAMMOGRAM FOR MALIGNANT NEOPLASM OF BREAST: ICD-10-CM

## 2024-08-07 DIAGNOSIS — E89.0 HYPOTHYROIDISM, POSTSURGICAL: Primary | ICD-10-CM

## 2024-08-07 DIAGNOSIS — E89.0 HYPOTHYROIDISM, POSTSURGICAL: ICD-10-CM

## 2024-08-07 LAB — TSH SERPL-ACNC: 15.49 MIU/L (ref 0.44–3.98)

## 2024-08-07 PROCEDURE — 84443 ASSAY THYROID STIM HORMONE: CPT

## 2024-08-07 PROCEDURE — 77067 SCR MAMMO BI INCL CAD: CPT

## 2024-08-07 PROCEDURE — 77063 BREAST TOMOSYNTHESIS BI: CPT | Performed by: STUDENT IN AN ORGANIZED HEALTH CARE EDUCATION/TRAINING PROGRAM

## 2024-08-07 PROCEDURE — 36415 COLL VENOUS BLD VENIPUNCTURE: CPT

## 2024-08-07 PROCEDURE — 77067 SCR MAMMO BI INCL CAD: CPT | Performed by: STUDENT IN AN ORGANIZED HEALTH CARE EDUCATION/TRAINING PROGRAM

## 2024-08-07 PROCEDURE — RXMED WILLOW AMBULATORY MEDICATION CHARGE

## 2024-08-07 RX ORDER — LEVOTHYROXINE SODIUM 125 UG/1
125 TABLET ORAL DAILY
Qty: 90 TABLET | Refills: 3 | Status: SHIPPED | OUTPATIENT
Start: 2024-08-07 | End: 2025-08-07

## 2024-08-07 NOTE — TELEPHONE ENCOUNTER
Pt came into the office needing a refill/renewal of her gabapentin. This is Jenny's pt. Thank you!  
Quality 431: Preventive Care And Screening: Unhealthy Alcohol Use - Screening: Patient not identified as an unhealthy alcohol user when screened for unhealthy alcohol use using a systematic screening method
Quality 47: Advance Care Plan: Advance care planning not documented, reason not otherwise specified.
Detail Level: Detailed
Quality 226: Preventive Care And Screening: Tobacco Use: Screening And Cessation Intervention: Patient screened for tobacco use and is an ex/non-smoker

## 2024-08-09 ENCOUNTER — APPOINTMENT (OUTPATIENT)
Dept: DERMATOLOGY | Facility: CLINIC | Age: 64
End: 2024-08-09
Payer: MEDICAID

## 2024-08-09 ENCOUNTER — PHARMACY VISIT (OUTPATIENT)
Dept: PHARMACY | Facility: CLINIC | Age: 64
End: 2024-08-09
Payer: MEDICAID

## 2024-08-27 ENCOUNTER — APPOINTMENT (OUTPATIENT)
Dept: PRIMARY CARE | Facility: CLINIC | Age: 64
End: 2024-08-27
Payer: MEDICAID

## 2024-08-27 ENCOUNTER — PATIENT OUTREACH (OUTPATIENT)
Dept: PRIMARY CARE | Facility: CLINIC | Age: 64
End: 2024-08-27

## 2024-08-27 ENCOUNTER — LAB (OUTPATIENT)
Dept: LAB | Facility: LAB | Age: 64
End: 2024-08-27
Payer: MEDICAID

## 2024-08-27 VITALS
HEART RATE: 67 BPM | BODY MASS INDEX: 30.17 KG/M2 | DIASTOLIC BLOOD PRESSURE: 94 MMHG | TEMPERATURE: 98.1 F | OXYGEN SATURATION: 96 % | WEIGHT: 176.7 LBS | HEIGHT: 64 IN | SYSTOLIC BLOOD PRESSURE: 134 MMHG | RESPIRATION RATE: 18 BRPM

## 2024-08-27 DIAGNOSIS — E11.3213 TYPE 2 DIABETES MELLITUS WITH BOTH EYES AFFECTED BY MILD NONPROLIFERATIVE RETINOPATHY AND MACULAR EDEMA, WITHOUT LONG-TERM CURRENT USE OF INSULIN (MULTI): Primary | ICD-10-CM

## 2024-08-27 DIAGNOSIS — F17.210 CIGARETTE NICOTINE DEPENDENCE WITHOUT COMPLICATION: ICD-10-CM

## 2024-08-27 DIAGNOSIS — K59.00 CONSTIPATION, UNSPECIFIED CONSTIPATION TYPE: ICD-10-CM

## 2024-08-27 DIAGNOSIS — E11.3213 TYPE 2 DIABETES MELLITUS WITH BOTH EYES AFFECTED BY MILD NONPROLIFERATIVE RETINOPATHY AND MACULAR EDEMA, WITHOUT LONG-TERM CURRENT USE OF INSULIN (MULTI): ICD-10-CM

## 2024-08-27 DIAGNOSIS — F10.90 ALCOHOL USE DISORDER: ICD-10-CM

## 2024-08-27 LAB
EST. AVERAGE GLUCOSE BLD GHB EST-MCNC: 134 MG/DL
FOLATE SERPL-MCNC: 15.6 NG/ML
HBA1C MFR BLD: 6.3 %
VIT B12 SERPL-MCNC: 346 PG/ML (ref 211–911)

## 2024-08-27 PROCEDURE — 3075F SYST BP GE 130 - 139MM HG: CPT

## 2024-08-27 PROCEDURE — 3080F DIAST BP >= 90 MM HG: CPT

## 2024-08-27 PROCEDURE — 1125F AMNT PAIN NOTED PAIN PRSNT: CPT

## 2024-08-27 PROCEDURE — RXMED WILLOW AMBULATORY MEDICATION CHARGE

## 2024-08-27 PROCEDURE — 99214 OFFICE O/P EST MOD 30 MIN: CPT

## 2024-08-27 PROCEDURE — 1159F MED LIST DOCD IN RCRD: CPT

## 2024-08-27 PROCEDURE — 83036 HEMOGLOBIN GLYCOSYLATED A1C: CPT

## 2024-08-27 PROCEDURE — 82607 VITAMIN B-12: CPT

## 2024-08-27 PROCEDURE — 82746 ASSAY OF FOLIC ACID SERUM: CPT

## 2024-08-27 PROCEDURE — 99406 BEHAV CHNG SMOKING 3-10 MIN: CPT

## 2024-08-27 PROCEDURE — 3008F BODY MASS INDEX DOCD: CPT

## 2024-08-27 PROCEDURE — 3044F HG A1C LEVEL LT 7.0%: CPT

## 2024-08-27 PROCEDURE — 4010F ACE/ARB THERAPY RXD/TAKEN: CPT

## 2024-08-27 PROCEDURE — 36415 COLL VENOUS BLD VENIPUNCTURE: CPT

## 2024-08-27 RX ORDER — GABAPENTIN 300 MG/1
300 CAPSULE ORAL NIGHTLY
Qty: 30 CAPSULE | Refills: 11 | Status: SHIPPED | OUTPATIENT
Start: 2024-08-27 | End: 2025-08-22

## 2024-08-27 RX ORDER — ISOPROPYL ALCOHOL 70 ML/100ML
1 SWAB TOPICAL 3 TIMES DAILY PRN
Qty: 100 EACH | Refills: 3 | Status: SHIPPED | OUTPATIENT
Start: 2024-08-27 | End: 2024-10-01

## 2024-08-27 RX ORDER — METFORMIN HYDROCHLORIDE 1000 MG/1
1000 TABLET ORAL
Qty: 180 TABLET | Refills: 3 | Status: SHIPPED | OUTPATIENT
Start: 2024-08-27 | End: 2024-08-30

## 2024-08-27 RX ORDER — BISACODYL 5 MG
TABLET, DELAYED RELEASE (ENTERIC COATED) ORAL
Qty: 30 TABLET | Refills: 11 | Status: SHIPPED | OUTPATIENT
Start: 2024-08-27 | End: 2024-09-28

## 2024-08-27 ASSESSMENT — PATIENT HEALTH QUESTIONNAIRE - PHQ9
SUM OF ALL RESPONSES TO PHQ9 QUESTIONS 1 AND 2: 0
2. FEELING DOWN, DEPRESSED OR HOPELESS: NOT AT ALL
1. LITTLE INTEREST OR PLEASURE IN DOING THINGS: NOT AT ALL

## 2024-08-27 ASSESSMENT — ENCOUNTER SYMPTOMS
OCCASIONAL FEELINGS OF UNSTEADINESS: 0
LOSS OF SENSATION IN FEET: 0
DEPRESSION: 0

## 2024-08-27 ASSESSMENT — PAIN SCALES - GENERAL: PAINLEVEL: 8

## 2024-08-27 NOTE — PROGRESS NOTES
Today I spoke with patient regarding message sent from PCP Payal Mistry via Secure chat.    Patient had questions on how to receive [employer]. Informed patient she would need to contact Social Security Dept at [phone].    Patient verbally agreed that she understands, and she had no further concerns.    [facility]  [id number]  [facility] [address]  [place] [id number]    Office Phone: [phone] [address]  Patient Navigator: [phone]

## 2024-08-27 NOTE — PROGRESS NOTES
I saw and evaluated the patient. I personally obtained the key and critical portions of the history and physical exam or was physically present for key and critical portions performed by the resident/fellow. I reviewed the resident/fellow's documentation and discussed the patient with the resident/fellow. I agree with the resident/fellow's medical decision making as documented in the note.  Discussed importance of abstinence from alcohol while on Metformin.  Could consider Januvia or Invokana.    Mallika Antunez MD

## 2024-08-27 NOTE — PROGRESS NOTES
Subjective   Patient ID: Katherine Sin is a 64 y.o. female presents for Follow-up (Wants blood sugar checked) and Med Refill.    HPI  DM-2  - meds: metformin, gabapentin  - missing doses?: ran out 2 weeks ago  - measuring BG at home?: no  - smoking: cigarettes 3/4 pack per day, since 13 yo - counseled on smoking cessation, patient not interested  - diet: doesn't eat healthy  - denies polydipsia, polyuria, vision changes  - occasional neuropathy in hands and feet requiring rollator    Alcohol  2-3 cans of 12oz beers couple times a week  Educated on importance of decreasing alcohol with metformin  Decline alcohol cessation at this time      Hypertension  BP Readings from Last 3 Encounters:   08/27/24 (!) 134/94   06/03/24 122/86   05/21/24 134/85     - meds: amlodioine  - missing doses?: no  - taking BP at home?: no  - Consistent NSAIDs?: no   - denies HA, chest pains, SOB, LE edema, vision changes    Needs a rollator with chair as her current one's wheels are wearing out and is a safety risk    Needs  for medicaid, will refer    Reviewed recent mammogram wnl, plan to repeat in 1 year    Current Outpatient Medications on File Prior to Visit   Medication Sig Dispense Refill    albuterol 90 mcg/actuation inhaler Inhale.      amLODIPine (Norvasc) 10 mg tablet Take 1 tablet (10 mg) by mouth once daily. 90 tablet 3    atorvastatin (Lipitor) 20 mg tablet TAKE 1 TABLET (20 MG) BY MOUTH ONCE DAILY. 90 tablet 2    calcium carbonate 600 mg calcium (1,500 mg) tablet Take 0.5 tablets (750 mg) by mouth 2 times a day with meals. 90 tablet 3    cetirizine (ZyrTEC) 10 mg tablet Take 1 tablet (10 mg) by mouth once daily. 30 tablet 11    cholecalciferol (Vitamin D-3) 50 MCG (2000 UT) tablet Take 1 tablet (50 mcg) by mouth once daily. 90 tablet 3    docusate sodium (Colace) 100 mg capsule TAKE 1 CAPSULE BY MOUTH DAILY 60 capsule 1    fluticasone (Flonase) 50 mcg/actuation nasal spray Administer into affected nostril(s).       "hydroxyurea (Hydrea) 500 mg capsule 3 Tablet      hydrOXYzine HCL (Atarax) 10 mg tablet Take 1 tablet (10 mg) by mouth if needed for itching. Can take 1 tablet during the day IF NEEDED for intense pruritus. Do not take if driving, cooking, or operating machinery. 30 tablet 0    levothyroxine (Synthroid, Levoxyl) 125 mcg tablet Take 1 tablet (125 mcg) by mouth early in the morning.. Take on an empty stomach at the same time each day, either 30 to 60 minutes prior to breakfast 90 tablet 3    lisinopril 40 mg tablet TAKE 1 TABLET BY MOUTH ONCE DAILY AS DIRECTED 90 tablet 3    [DISCONTINUED] benzalkonium chloride 0.13 % towelette Please check blood pressure daily and documen numbers into a journal. Please bring journal into clinic whenever you see your doctor.      [DISCONTINUED] bisacodyl (Dulcolax) 5 mg EC tablet TAKE 1 TABLET BY MOUTH ONCE DAILY AS NEEDED FOR CONSTIPATION 30 tablet 3    furosemide (Lasix) 40 mg tablet Take 1 tablet (40 mg) by mouth 2 times a day.      hydrOXYzine HCL (Atarax) 25 mg tablet Take 1 tablet (25 mg) by mouth once daily at bedtime. 30 tablet 0    [DISCONTINUED] gabapentin (Neurontin) 300 mg capsule Take 1 capsule (300 mg) by mouth once daily at bedtime. 30 capsule 0    [DISCONTINUED] metFORMIN (Glucophage) 1,000 mg tablet TAKE 1 TABLET BY MOUTH TWO TIMES A DAY WITH MEALS 180 tablet 3     No current facility-administered medications on file prior to visit.        Objective   Vitals: BP (!) 134/94 (BP Location: Right arm, Patient Position: Sitting, BP Cuff Size: Adult)   Pulse 67   Temp 36.7 °C (98.1 °F) (Temporal)   Resp 18   Ht 1.626 m (5' 4\")   Wt 80.2 kg (176 lb 11.2 oz)   SpO2 96%   BMI 30.33 kg/m²      Physical Exam  General: well-appearing, NAD, uses rollator to ambulate  HEENT: NC/AT  Cardiac: rrr, no m/r/g  Lungs: normal work of breathing, CTAB  Abdomen: non-distended  Neuro: grossly intact  MSK: No peripheral edema, cyanosis, or pallor  Psych: no depression, " "anxiety      Assessment/Plan     Problem List Items Addressed This Visit             ICD-10-CM    Nicotine dependence F17.200     Declined smoking cessation at this time         Relevant Orders    CT lung screening low dose    Type 2 diabetes mellitus with both eyes affected by mild nonproliferative retinopathy and macular edema, without long-term current use of insulin (Multi) - Primary E11.3213     Will plan to repeat A1c if persistently less than 7, can discuss with patient discontinuing metformin.  The main concern is that patient drinks alcohol which can interact with metformin and cause life-threatening lactic acidosis.  Educated patient on the risk and encouraged to decrease alcohol consumption, patient expressed understanding but states\" I have been taking this for a while and drinking and nothing happens.\"  If patient amenable can consider SGLT-2 like Invokana as long as patient hydrates while drinking.         Relevant Medications    metFORMIN (Glucophage) 1,000 mg tablet    gabapentin (Neurontin) 300 mg capsule    alcohol swabs pads, medicated    Other Relevant Orders    Walker with Seat    Hemoglobin A1c     Other Visit Diagnoses         Codes    Constipation, unspecified constipation type     K59.00    Relevant Medications    bisacodyl (Dulcolax) 5 mg EC tablet    Alcohol use disorder     F10.90    Relevant Orders    Vitamin B12    Folate            Attending Supervision: seen and discussed with attending physician (cosigner listed on this note).    RTC in 4-6 months, or earlier as needed.    Patient seen and discussed with attending physician Dr. Antunez    Plan preliminary until cosigned by attending physician.    Payal Mistry M.D.  Family Medicine  PGY-2  "

## 2024-08-27 NOTE — ASSESSMENT & PLAN NOTE
"Will plan to repeat A1c if persistently less than 7, can discuss with patient discontinuing metformin.  The main concern is that patient drinks alcohol which can interact with metformin and cause life-threatening lactic acidosis.  Educated patient on the risk and encouraged to decrease alcohol consumption, patient expressed understanding but states\" I have been taking this for a while and drinking and nothing happens.\"  If patient amenable can consider SGLT-2 like Invokana as long as patient hydrates while drinking.  "

## 2024-08-29 ENCOUNTER — PHARMACY VISIT (OUTPATIENT)
Dept: PHARMACY | Facility: CLINIC | Age: 64
End: 2024-08-29
Payer: MEDICAID

## 2024-08-30 ENCOUNTER — TELEPHONE (OUTPATIENT)
Dept: PRIMARY CARE | Facility: CLINIC | Age: 64
End: 2024-08-30
Payer: MEDICAID

## 2024-08-30 DIAGNOSIS — E11.3213 TYPE 2 DIABETES MELLITUS WITH BOTH EYES AFFECTED BY MILD NONPROLIFERATIVE RETINOPATHY AND MACULAR EDEMA, WITHOUT LONG-TERM CURRENT USE OF INSULIN (MULTI): ICD-10-CM

## 2024-08-30 RX ORDER — METFORMIN HYDROCHLORIDE 1000 MG/1
1000 TABLET ORAL DAILY
Qty: 30 TABLET | Refills: 3 | Status: SHIPPED | OUTPATIENT
Start: 2024-08-30 | End: 2024-12-28

## 2024-08-30 NOTE — TELEPHONE ENCOUNTER
Call patient about A1c results.  Advised that A1c is at goal less than 7.  Advised that we would like to discontinue metformin because of its risk of lactic acidosis with alcohol.  Patient reports she is still drinking and actually had 3 beers today.  Patient understands the risk and states that she does not want to come off her metformin because this is when she will start having symptoms of tremors.  Attempted to explain that tremor should not be related to stopping metformin but patient did not want to stop and understands that she might end up in the hospital and even die. Patient agreeable to decreasing the dose to 1000 mg BID-> once a day.  Reinforced the importance of not drinking excessively while taking metformin.  Will re-engage patient at next visit.

## 2024-08-31 PROCEDURE — RXMED WILLOW AMBULATORY MEDICATION CHARGE

## 2024-09-06 ENCOUNTER — PHARMACY VISIT (OUTPATIENT)
Dept: PHARMACY | Facility: CLINIC | Age: 64
End: 2024-09-06
Payer: MEDICAID

## 2024-09-19 ENCOUNTER — HOSPITAL ENCOUNTER (OUTPATIENT)
Dept: RADIOLOGY | Facility: HOSPITAL | Age: 64
Discharge: HOME | End: 2024-09-19
Payer: MEDICAID

## 2024-09-19 DIAGNOSIS — F17.210 CIGARETTE NICOTINE DEPENDENCE WITHOUT COMPLICATION: ICD-10-CM

## 2024-09-19 PROCEDURE — 71271 CT THORAX LUNG CANCER SCR C-: CPT

## 2024-09-20 DIAGNOSIS — R91.8 PULMONARY NODULES: Primary | ICD-10-CM

## 2024-09-22 PROCEDURE — RXMED WILLOW AMBULATORY MEDICATION CHARGE

## 2024-09-25 ENCOUNTER — PHARMACY VISIT (OUTPATIENT)
Dept: PHARMACY | Facility: CLINIC | Age: 64
End: 2024-09-25
Payer: MEDICAID

## 2024-09-30 PROCEDURE — RXMED WILLOW AMBULATORY MEDICATION CHARGE

## 2024-10-01 ENCOUNTER — PHARMACY VISIT (OUTPATIENT)
Dept: PHARMACY | Facility: CLINIC | Age: 64
End: 2024-10-01
Payer: MEDICAID

## 2024-10-06 PROCEDURE — RXMED WILLOW AMBULATORY MEDICATION CHARGE

## 2024-10-07 DIAGNOSIS — I10 PRIMARY HYPERTENSION: ICD-10-CM

## 2024-10-07 PROCEDURE — RXMED WILLOW AMBULATORY MEDICATION CHARGE

## 2024-10-07 RX ORDER — AMLODIPINE BESYLATE 10 MG/1
10 TABLET ORAL DAILY
Qty: 90 TABLET | Refills: 3 | Status: SHIPPED | OUTPATIENT
Start: 2024-10-07 | End: 2025-10-07

## 2024-10-07 NOTE — TELEPHONE ENCOUNTER
Pt called requesting refill of levothyroxine and amlodipine. Noted available refills. Call placed to pt to make aware of available refills. Pt denies medication availability for amlodipine, stated she was informed that provider approval needed. Stated informed levothyroxine is too early to fill. Call placed to pharmacy for clarification. Spoke with pharmacist. Confirms pt has no refills following last dispense amlodipine June 24th 2024. Also confirms earliest fill for levothyroxine is Nov 5th 2024. Call placed to pt to make aware. Pt confirms understanding. Amlodipine order pended and routed to provider.

## 2024-10-09 ENCOUNTER — PHARMACY VISIT (OUTPATIENT)
Dept: PHARMACY | Facility: CLINIC | Age: 64
End: 2024-10-09
Payer: MEDICAID

## 2024-10-14 ENCOUNTER — APPOINTMENT (OUTPATIENT)
Dept: PRIMARY CARE | Facility: CLINIC | Age: 64
End: 2024-10-14
Payer: MEDICAID

## 2024-10-29 PROCEDURE — RXMED WILLOW AMBULATORY MEDICATION CHARGE

## 2024-10-31 ENCOUNTER — PHARMACY VISIT (OUTPATIENT)
Dept: PHARMACY | Facility: CLINIC | Age: 64
End: 2024-10-31
Payer: MEDICAID

## 2024-11-04 ENCOUNTER — HOSPITAL ENCOUNTER (EMERGENCY)
Facility: HOSPITAL | Age: 64
Discharge: HOME | End: 2024-11-05
Attending: EMERGENCY MEDICINE
Payer: MEDICAID

## 2024-11-04 VITALS
TEMPERATURE: 96.8 F | HEIGHT: 63 IN | WEIGHT: 160 LBS | DIASTOLIC BLOOD PRESSURE: 82 MMHG | HEART RATE: 71 BPM | OXYGEN SATURATION: 94 % | RESPIRATION RATE: 16 BRPM | BODY MASS INDEX: 28.35 KG/M2 | SYSTOLIC BLOOD PRESSURE: 132 MMHG

## 2024-11-04 DIAGNOSIS — G89.29 CHRONIC BACK PAIN, UNSPECIFIED BACK LOCATION, UNSPECIFIED BACK PAIN LATERALITY: Primary | ICD-10-CM

## 2024-11-04 DIAGNOSIS — M54.9 CHRONIC BACK PAIN, UNSPECIFIED BACK LOCATION, UNSPECIFIED BACK PAIN LATERALITY: Primary | ICD-10-CM

## 2024-11-04 PROCEDURE — 99283 EMERGENCY DEPT VISIT LOW MDM: CPT

## 2024-11-04 PROCEDURE — RXMED WILLOW AMBULATORY MEDICATION CHARGE

## 2024-11-04 ASSESSMENT — LIFESTYLE VARIABLES
EVER FELT BAD OR GUILTY ABOUT YOUR DRINKING: NO
HAVE PEOPLE ANNOYED YOU BY CRITICIZING YOUR DRINKING: NO
EVER HAD A DRINK FIRST THING IN THE MORNING TO STEADY YOUR NERVES TO GET RID OF A HANGOVER: NO
HAVE YOU EVER FELT YOU SHOULD CUT DOWN ON YOUR DRINKING: NO
TOTAL SCORE: 0

## 2024-11-04 ASSESSMENT — PAIN DESCRIPTION - PAIN TYPE: TYPE: ACUTE PAIN

## 2024-11-04 ASSESSMENT — COLUMBIA-SUICIDE SEVERITY RATING SCALE - C-SSRS
6. HAVE YOU EVER DONE ANYTHING, STARTED TO DO ANYTHING, OR PREPARED TO DO ANYTHING TO END YOUR LIFE?: NO
2. HAVE YOU ACTUALLY HAD ANY THOUGHTS OF KILLING YOURSELF?: NO
1. IN THE PAST MONTH, HAVE YOU WISHED YOU WERE DEAD OR WISHED YOU COULD GO TO SLEEP AND NOT WAKE UP?: NO

## 2024-11-04 ASSESSMENT — PAIN SCALES - GENERAL: PAINLEVEL_OUTOF10: 10 - WORST POSSIBLE PAIN

## 2024-11-04 ASSESSMENT — PAIN DESCRIPTION - DESCRIPTORS: DESCRIPTORS: ACHING

## 2024-11-04 ASSESSMENT — PAIN - FUNCTIONAL ASSESSMENT: PAIN_FUNCTIONAL_ASSESSMENT: 0-10

## 2024-11-04 ASSESSMENT — PAIN DESCRIPTION - FREQUENCY: FREQUENCY: CONSTANT/CONTINUOUS

## 2024-11-04 ASSESSMENT — PAIN DESCRIPTION - LOCATION: LOCATION: GENERALIZED

## 2024-11-05 ENCOUNTER — CLINICAL SUPPORT (OUTPATIENT)
Dept: EMERGENCY MEDICINE | Facility: HOSPITAL | Age: 64
End: 2024-11-05
Payer: MEDICAID

## 2024-11-05 ENCOUNTER — PHARMACY VISIT (OUTPATIENT)
Dept: PHARMACY | Facility: CLINIC | Age: 64
End: 2024-11-05
Payer: MEDICAID

## 2024-11-05 ENCOUNTER — APPOINTMENT (OUTPATIENT)
Dept: RADIOLOGY | Facility: HOSPITAL | Age: 64
End: 2024-11-05
Payer: MEDICAID

## 2024-11-05 LAB
ALBUMIN SERPL BCP-MCNC: 3.7 G/DL (ref 3.4–5)
ALP SERPL-CCNC: 64 U/L (ref 33–136)
ALT SERPL W P-5'-P-CCNC: 11 U/L (ref 7–45)
ANION GAP SERPL CALC-SCNC: 16 MMOL/L (ref 10–20)
AST SERPL W P-5'-P-CCNC: 14 U/L (ref 9–39)
BASOPHILS # BLD AUTO: 0.04 X10*3/UL (ref 0–0.1)
BASOPHILS NFR BLD AUTO: 0.6 %
BILIRUB SERPL-MCNC: 0.3 MG/DL (ref 0–1.2)
BUN SERPL-MCNC: 10 MG/DL (ref 6–23)
CA-I BLD-SCNC: 1.22 MMOL/L (ref 1.1–1.33)
CALCIUM SERPL-MCNC: 9.4 MG/DL (ref 8.6–10.6)
CARDIAC TROPONIN I PNL SERPL HS: 3 NG/L (ref 0–34)
CARDIAC TROPONIN I PNL SERPL HS: 3 NG/L (ref 0–34)
CHLORIDE SERPL-SCNC: 108 MMOL/L (ref 98–107)
CO2 SERPL-SCNC: 22 MMOL/L (ref 21–32)
CREAT SERPL-MCNC: 0.73 MG/DL (ref 0.5–1.05)
EGFRCR SERPLBLD CKD-EPI 2021: >90 ML/MIN/1.73M*2
EOSINOPHIL # BLD AUTO: 0.08 X10*3/UL (ref 0–0.7)
EOSINOPHIL NFR BLD AUTO: 1.2 %
ERYTHROCYTE [DISTWIDTH] IN BLOOD BY AUTOMATED COUNT: 14.6 % (ref 11.5–14.5)
GLUCOSE SERPL-MCNC: 102 MG/DL (ref 74–99)
HCT VFR BLD AUTO: 34.7 % (ref 36–46)
HGB BLD-MCNC: 12.5 G/DL (ref 12–16)
IMM GRANULOCYTES # BLD AUTO: 0.02 X10*3/UL (ref 0–0.7)
IMM GRANULOCYTES NFR BLD AUTO: 0.3 % (ref 0–0.9)
LIPASE SERPL-CCNC: 19 U/L (ref 9–82)
LYMPHOCYTES # BLD AUTO: 2.4 X10*3/UL (ref 1.2–4.8)
LYMPHOCYTES NFR BLD AUTO: 36.5 %
MCH RBC QN AUTO: 28.8 PG (ref 26–34)
MCHC RBC AUTO-ENTMCNC: 36 G/DL (ref 32–36)
MCV RBC AUTO: 80 FL (ref 80–100)
MONOCYTES # BLD AUTO: 0.6 X10*3/UL (ref 0.1–1)
MONOCYTES NFR BLD AUTO: 9.1 %
NEUTROPHILS # BLD AUTO: 3.43 X10*3/UL (ref 1.2–7.7)
NEUTROPHILS NFR BLD AUTO: 52.3 %
NRBC BLD-RTO: 0 /100 WBCS (ref 0–0)
PLATELET # BLD AUTO: 246 X10*3/UL (ref 150–450)
POTASSIUM SERPL-SCNC: 4.1 MMOL/L (ref 3.5–5.3)
PROT SERPL-MCNC: 6.2 G/DL (ref 6.4–8.2)
RBC # BLD AUTO: 4.34 X10*6/UL (ref 4–5.2)
SODIUM SERPL-SCNC: 142 MMOL/L (ref 136–145)
WBC # BLD AUTO: 6.6 X10*3/UL (ref 4.4–11.3)

## 2024-11-05 PROCEDURE — 80053 COMPREHEN METABOLIC PANEL: CPT

## 2024-11-05 PROCEDURE — 84484 ASSAY OF TROPONIN QUANT: CPT

## 2024-11-05 PROCEDURE — RXMED WILLOW AMBULATORY MEDICATION CHARGE

## 2024-11-05 PROCEDURE — 93005 ELECTROCARDIOGRAM TRACING: CPT

## 2024-11-05 PROCEDURE — 71046 X-RAY EXAM CHEST 2 VIEWS: CPT | Performed by: RADIOLOGY

## 2024-11-05 PROCEDURE — 85025 COMPLETE CBC W/AUTO DIFF WBC: CPT

## 2024-11-05 PROCEDURE — 2500000005 HC RX 250 GENERAL PHARMACY W/O HCPCS: Mod: SE

## 2024-11-05 PROCEDURE — 71046 X-RAY EXAM CHEST 2 VIEWS: CPT

## 2024-11-05 PROCEDURE — 36415 COLL VENOUS BLD VENIPUNCTURE: CPT

## 2024-11-05 PROCEDURE — 83690 ASSAY OF LIPASE: CPT

## 2024-11-05 PROCEDURE — 82330 ASSAY OF CALCIUM: CPT

## 2024-11-05 RX ORDER — LIDOCAINE 50 MG/G
1 PATCH TOPICAL DAILY
Qty: 5 PATCH | Refills: 0 | Status: SHIPPED | OUTPATIENT
Start: 2024-11-05 | End: 2024-11-10

## 2024-11-05 RX ORDER — ACETAMINOPHEN 325 MG/1
975 TABLET ORAL ONCE
Status: DISCONTINUED | OUTPATIENT
Start: 2024-11-05 | End: 2024-11-05 | Stop reason: HOSPADM

## 2024-11-05 RX ORDER — LIDOCAINE 560 MG/1
1 PATCH PERCUTANEOUS; TOPICAL; TRANSDERMAL ONCE
Status: DISCONTINUED | OUTPATIENT
Start: 2024-11-05 | End: 2024-11-05 | Stop reason: HOSPADM

## 2024-11-05 RX ORDER — CYCLOBENZAPRINE HCL 5 MG
5 TABLET ORAL 2 TIMES DAILY PRN
Qty: 10 TABLET | Refills: 0 | Status: SHIPPED | OUTPATIENT
Start: 2024-11-05 | End: 2024-11-10

## 2024-11-05 NOTE — ED TRIAGE NOTES
Pt reports 10/10 generalized back pain and left shoulder pain that she received a shot in she states that It was the pneumonia or flu vaccine. Pt states that she fell 2 weeks ago

## 2024-11-05 NOTE — ED PROVIDER NOTES
"Emergency Department Provider Note        History of Present Illness     History provided by: Patient  Limitations to History: None  External Records Reviewed with Brief Summary: Outpatient progress note from 8/27/24 which showed routine FUV for medication refills, hx T2DM, tobacco use, EtOH use     HPI:  Katherine Sin is a 64 y.o. female with PMHx papillary thyroid carcinoma s/p total thyroidectomy, T2DM, EtOH use disorder who presents for multiple medical complaints. Patient reports she has pain \"all over\" related to arthritis x2 months, additionally states she fell two weeks ago and hit the back of her head, since then has had intermittent bifrontal headaches.     States she drinks beer daily, but had not had any for a few days before today. Denies any history of DTs/shakes/withdrawal from EtOH previously. Reports no cannabis or other recreational drug use.    Physical Exam   Triage vitals:  T 36 °C (96.8 °F)  HR 71  /82  RR 16  O2 94 % None (Room air)    Physical exam:   Triage vitals reviewed.  Constitutional: Elderly adult in no acute distress, non toxic in appearance  Head: Normocephalic, atraumatic, no scalp tenderness/hematoma/laceration  Skin: Intact, dry. No rashes or lesions.  Eyes: Pupils are equal. No conjunctival injection.  Neck: Supple. Trachea is midline. No midline C-spine TTP. L paraspinal tenderness  Pulmonary: Normal work of breathing with no accessory muscle use noted.  Clear to auscultation bilaterally.   Cardiovascular: Normal rate, regular rhythm. No murmurs/gallops/rubs appreciated. 2+ radial pulses bilaterally. Pain reproducible on chest wall palpation.  Abdomen: Soft, nondistended. Nontender to palpation.  Back: No midline T spine tenderness. There is diffuse tenderness to palpation of lumbar spine and paraspinal muscles.  Extremities: No gross deformities.  Moving all extremities spontaneously without difficulty. Tenderness to palpation of L upper arm, ROM intact without " pain.  Neuro: Awake and alert, oriented x4. Answers questions appropriately. Speech is clear. Face is symmetric without facial droop and facial sensation to light touch equal bilaterally. Uvula midline. Tongue protrusion midline. Hearing intact bilaterally. Full and equal shoulder shrug. 5/5 motor strength of UEs and LEs. Sensation to light touch intact in all four extremities. No pronator drift.    Medical Decision Making & ED Course   Medical Decision Makin y.o. female with PMHx T2DM, EtOH use disorder who presents for multiple medical complaints. On arrival, patient is afebrile, HDS, in no acute distress. On exam, there is tenderness over the L trapezius, L upper arm, and bilateral lumbar spine. No LE weakness/numbness or saddle anesthesia.  Patient was given a lidocaine patch for pain, offered Tylenol but patient declined.    Given daily alcohol use, will obtain CBC, CMP. CXR without evidence of pneumonia, did show mild interstitial prominence.  Time course of symptoms lasting ~2 months per patient is not consistent with viral illness and is more likely chronic in nature.  Patient's chest pain is reproducible on palpation, so is more consistent with musculoskeletal pain rather than cardiac.  Additionally, EKG with sinus bradycardia and no evidence of ischemia, so low concern for STEMI.  Initial troponin within normal limits so lower concern for NSTEMI, given time course.  Given age and risk factors, will obtain delta troponin.  Considered alcohol withdrawal as a cause for her symptoms, however patient does endorse drinking beer earlier today.  Low back pain is chronic per patient, she does have paraspinal muscle tenderness on exam, no lower extremity weakness or decreased sensation and no saddle anesthesia, so low concern for acute spinal cord compression.  Patient denies IVDU, and is afebrile, so spinal epidural abscess is less likely.    Patient was signed out to oncoming provider pending completion of  her workup.  Anticipate if her laboratory workup is negative that she can be discharged.  ----    Differential diagnoses considered include but are not limited to: Musculoskeletal pain, chronic pain, acute coronary syndrome, pneumonia, electrolyte abnormality, alcohol withdrawal     Social Determinants of Health which Significantly Impact Care: Substance use disorder     EKG Independent Interpretation:  EKG interpreted by attending physician; please see ED course for full interpretation    Independent Result Review and Interpretation: Relevant laboratory and radiographic results were reviewed and independently interpreted by myself.  As necessary, they are commented on in the ED Course.    Chronic conditions affecting the patient's care: As documented above in [facility]    The patient was discussed with the following consultants/services: None    Care Considerations: As documented above in [facility]    ED Course:  ED Course as of 11/05/24 1455   Tue Nov 05, 2024   0220 ECG 12 lead  Interpreted by me.  11/5/2024 at 0039.  Sinus bradycardia 58 bpm.   QRS 86, QTc 394.  No ST elevation. [MC]   0455 Patient well-appearing.  Described pain of the left shoulder and low back for between 2 weeks and 2 months.  Reports no current headache, neck pain, fever, chest pain, shortness of breath, abdominal pain, upper or lower extremity weakness or numbness.  No midline cervical thoracic or lumbar tenderness.  Abdomen soft nontender.  Mild discomfort with abduction of the left shoulder though no pain with active or passive internal/external rotation flexion or extension.  Strength and sensation upper and lower extremities intact and ambulatory with a steady gait.  Patient requested to return home and plans to follow-up with primary care physician.  Encouraged to continue taking gabapentin and Tylenol.  Prescription for lidocaine patch and Flexeril. []      ED Course User Index  [MC] Rosendo Chester MD         Diagnoses as of 11/05/24  0214   Chronic back pain, unspecified back location, unspecified back pain laterality     Disposition   Patient was signed out to Dr. Valderrama at 0230 pending completion of their work-up.  Please see the next provider's transition of care note for the remainder of the patient's care.     Patient seen and discussed with ED attending physician.    Roxane Bermeo MD  Emergency Medicine     Roxane Bermeo MD  Resident  11/05/24 [id number]       Roxane Bermeo MD  Resident  11/05/24 0373

## 2024-11-05 NOTE — DISCHARGE INSTRUCTIONS
Continue to take gabapentin as prescribed.  May take Tylenol and use lidocaine patch.  Please follow-up with primary care physician and return to emergency department for reassessment if new or worsening symptoms.

## 2024-11-06 ENCOUNTER — PHARMACY VISIT (OUTPATIENT)
Dept: PHARMACY | Facility: CLINIC | Age: 64
End: 2024-11-06
Payer: MEDICAID

## 2024-11-06 NOTE — PROGRESS NOTES
Emergency Department Transition of Care Note       Signout   I received Katherine Sin in signout from Dr. Bermeo.  Please see the previous note for all HPI, PE and MDM up to the time of signout at 0200.    In brief Katherine Sin is an 64 y.o. female presenting for   Chief Complaint   Patient presents with    Back Pain           ED Course & Medical Decision Making   At the time of signout, the patient's disposition is pending labs and imaging.  This is a 64-year-old female with history of thyroidectomy, alcohol use, hypertension, hyperlipidemia who presents with shoulder tenderness and lumbar tenderness.  She presents reporting pain all over.  Workup initiated by the previous provider returned showing no leukocytosis or anemia.  No electrolyte abnormalities.  Specifically calcium was normal.  Troponins are normal x 2.  Lipase is normal.  Chest x-ray was obtained that showed cardiomegaly with possible developing edema but no other acute findings or consolidations.  Patient was treated symptomatically however did refuse symptomatic control multiple times.  Given her negative workup she is appropriate for discharge home and request so.  She will follow-up with her primary care doctor as needed.  She remained stable and discharged home.    ED Course:  ED Course as of 11/06/24 0523   Tue Nov 05, 2024   0220 ECG 12 lead  Interpreted by me.  11/5/2024 at 0039.  Sinus bradycardia 58 bpm.   QRS 86, QTc 394.  No ST elevation. [MC]   0455 Patient well-appearing.  Described pain of the left shoulder and low back for between 2 weeks and 2 months.  Reports no current headache, neck pain, fever, chest pain, shortness of breath, abdominal pain, upper or lower extremity weakness or numbness.  No midline cervical thoracic or lumbar tenderness.  Abdomen soft nontender.  Mild discomfort with abduction of the left shoulder though no pain with active or passive internal/external rotation flexion or extension.  Strength and  sensation upper and lower extremities intact and ambulatory with a steady gait.  Patient requested to return home and plans to follow-up with primary care physician.  Encouraged to continue taking gabapentin and Tylenol.  Prescription for lidocaine patch and Flexeril. [MC]      ED Course User Index  [MC] Rosendo Chester MD         Diagnoses as of 11/06/24 0523   Chronic back pain, unspecified back location, unspecified back pain laterality       Patient seen by and discussed with the attending emergency medicine physician.     Disposition   As a result of the work-up, the patient was discharged home.  she was informed of her diagnosis and instructed to come back with any concerns or worsening of condition.  she and was agreeable to the plan as discussed above.  she was given the opportunity to ask questions.  All of the patient's questions were answered.    Procedures   Procedures    Patient seen and discussed with ED attending physician.    Harinder Valderrama DO  Emergency Medicine PGY-3  [facility]

## 2024-11-22 ENCOUNTER — LAB (OUTPATIENT)
Dept: LAB | Facility: LAB | Age: 64
End: 2024-11-22
Payer: MEDICAID

## 2024-11-22 DIAGNOSIS — E89.0 HYPOTHYROIDISM, POSTSURGICAL: ICD-10-CM

## 2024-11-22 LAB — TSH SERPL-ACNC: 0.21 MIU/L (ref 0.44–3.98)

## 2024-11-22 PROCEDURE — 36415 COLL VENOUS BLD VENIPUNCTURE: CPT

## 2024-11-22 PROCEDURE — 86800 THYROGLOBULIN ANTIBODY: CPT

## 2024-11-22 PROCEDURE — 84443 ASSAY THYROID STIM HORMONE: CPT

## 2024-11-28 PROCEDURE — RXMED WILLOW AMBULATORY MEDICATION CHARGE

## 2024-12-04 ENCOUNTER — PHARMACY VISIT (OUTPATIENT)
Dept: PHARMACY | Facility: CLINIC | Age: 64
End: 2024-12-04
Payer: MEDICAID

## 2024-12-04 ENCOUNTER — APPOINTMENT (OUTPATIENT)
Dept: ENDOCRINOLOGY | Facility: CLINIC | Age: 64
End: 2024-12-04
Payer: MEDICAID

## 2024-12-04 VITALS
HEART RATE: 68 BPM | DIASTOLIC BLOOD PRESSURE: 79 MMHG | SYSTOLIC BLOOD PRESSURE: 130 MMHG | WEIGHT: 174 LBS | HEIGHT: 64 IN | BODY MASS INDEX: 29.71 KG/M2

## 2024-12-04 DIAGNOSIS — E89.0 HYPOTHYROIDISM, POSTSURGICAL: Primary | ICD-10-CM

## 2024-12-04 LAB — POC HEMOGLOBIN A1C: 6.2 % (ref 4.2–6.5)

## 2024-12-04 PROCEDURE — 3078F DIAST BP <80 MM HG: CPT | Performed by: STUDENT IN AN ORGANIZED HEALTH CARE EDUCATION/TRAINING PROGRAM

## 2024-12-04 PROCEDURE — 83036 HEMOGLOBIN GLYCOSYLATED A1C: CPT | Performed by: STUDENT IN AN ORGANIZED HEALTH CARE EDUCATION/TRAINING PROGRAM

## 2024-12-04 PROCEDURE — 1159F MED LIST DOCD IN RCRD: CPT | Performed by: STUDENT IN AN ORGANIZED HEALTH CARE EDUCATION/TRAINING PROGRAM

## 2024-12-04 PROCEDURE — 99214 OFFICE O/P EST MOD 30 MIN: CPT | Performed by: STUDENT IN AN ORGANIZED HEALTH CARE EDUCATION/TRAINING PROGRAM

## 2024-12-04 PROCEDURE — 4010F ACE/ARB THERAPY RXD/TAKEN: CPT | Performed by: STUDENT IN AN ORGANIZED HEALTH CARE EDUCATION/TRAINING PROGRAM

## 2024-12-04 PROCEDURE — 3044F HG A1C LEVEL LT 7.0%: CPT | Performed by: STUDENT IN AN ORGANIZED HEALTH CARE EDUCATION/TRAINING PROGRAM

## 2024-12-04 PROCEDURE — 3075F SYST BP GE 130 - 139MM HG: CPT | Performed by: STUDENT IN AN ORGANIZED HEALTH CARE EDUCATION/TRAINING PROGRAM

## 2024-12-04 PROCEDURE — 1125F AMNT PAIN NOTED PAIN PRSNT: CPT | Performed by: STUDENT IN AN ORGANIZED HEALTH CARE EDUCATION/TRAINING PROGRAM

## 2024-12-04 PROCEDURE — 3008F BODY MASS INDEX DOCD: CPT | Performed by: STUDENT IN AN ORGANIZED HEALTH CARE EDUCATION/TRAINING PROGRAM

## 2024-12-04 ASSESSMENT — PAIN SCALES - GENERAL: PAINLEVEL_OUTOF10: 8

## 2024-12-04 NOTE — PATIENT INSTRUCTIONS
PLEASE HAVE BLOOD WORK DONE ABOUT 1 WEEK BEFORE YOUR NEXT APPOINTMENT WITH ME IN JUNE  LAB ORDERS ARE ALREADY IN THE SYSTEM FOR YOU

## 2024-12-06 LAB — SCAN RESULT: ABNORMAL

## 2024-12-21 DIAGNOSIS — I10 PRIMARY HYPERTENSION: ICD-10-CM

## 2024-12-27 PROCEDURE — RXMED WILLOW AMBULATORY MEDICATION CHARGE

## 2024-12-30 ENCOUNTER — PHARMACY VISIT (OUTPATIENT)
Dept: PHARMACY | Facility: CLINIC | Age: 64
End: 2024-12-30
Payer: MEDICAID

## 2024-12-31 DIAGNOSIS — E78.5 DYSLIPIDEMIA: ICD-10-CM

## 2024-12-31 LAB
ATRIAL RATE: 58 BPM
P AXIS: 50 DEGREES
P OFFSET: 202 MS
P ONSET: 144 MS
PR INTERVAL: 166 MS
Q ONSET: 227 MS
QRS COUNT: 10 BEATS
QRS DURATION: 86 MS
QT INTERVAL: 402 MS
QTC CALCULATION(BAZETT): 394 MS
QTC FREDERICIA: 397 MS
R AXIS: -34 DEGREES
T AXIS: -13 DEGREES
T OFFSET: 428 MS
VENTRICULAR RATE: 58 BPM

## 2024-12-31 PROCEDURE — RXMED WILLOW AMBULATORY MEDICATION CHARGE

## 2025-01-03 PROCEDURE — RXMED WILLOW AMBULATORY MEDICATION CHARGE

## 2025-01-03 RX ORDER — ATORVASTATIN CALCIUM 20 MG/1
20 TABLET, FILM COATED ORAL
Qty: 90 TABLET | Refills: 2 | Status: SHIPPED | OUTPATIENT
Start: 2025-01-03 | End: 2026-01-03

## 2025-01-03 RX ORDER — LISINOPRIL 40 MG/1
40 TABLET ORAL DAILY
Qty: 90 TABLET | Refills: 3 | Status: SHIPPED | OUTPATIENT
Start: 2025-01-03 | End: 2026-01-02

## 2025-01-06 ENCOUNTER — PHARMACY VISIT (OUTPATIENT)
Dept: PHARMACY | Facility: CLINIC | Age: 65
End: 2025-01-06
Payer: MEDICAID

## 2025-01-07 ENCOUNTER — PHARMACY VISIT (OUTPATIENT)
Dept: PHARMACY | Facility: CLINIC | Age: 65
End: 2025-01-07
Payer: MEDICAID

## 2025-01-26 PROCEDURE — RXMED WILLOW AMBULATORY MEDICATION CHARGE

## 2025-01-27 PROCEDURE — RXMED WILLOW AMBULATORY MEDICATION CHARGE

## 2025-01-29 ENCOUNTER — PHARMACY VISIT (OUTPATIENT)
Dept: PHARMACY | Facility: CLINIC | Age: 65
End: 2025-01-29
Payer: MEDICAID

## 2025-02-17 PROCEDURE — RXMED WILLOW AMBULATORY MEDICATION CHARGE

## 2025-02-20 ENCOUNTER — PHARMACY VISIT (OUTPATIENT)
Dept: PHARMACY | Facility: CLINIC | Age: 65
End: 2025-02-20
Payer: MEDICAID

## 2025-02-21 PROCEDURE — RXMED WILLOW AMBULATORY MEDICATION CHARGE

## 2025-02-24 ENCOUNTER — PHARMACY VISIT (OUTPATIENT)
Dept: PHARMACY | Facility: CLINIC | Age: 65
End: 2025-02-24
Payer: MEDICAID

## 2025-02-25 PROCEDURE — RXMED WILLOW AMBULATORY MEDICATION CHARGE

## 2025-02-27 ENCOUNTER — PHARMACY VISIT (OUTPATIENT)
Dept: PHARMACY | Facility: CLINIC | Age: 65
End: 2025-02-27
Payer: MEDICAID

## 2025-03-24 PROCEDURE — RXMED WILLOW AMBULATORY MEDICATION CHARGE

## 2025-03-27 ENCOUNTER — PHARMACY VISIT (OUTPATIENT)
Dept: PHARMACY | Facility: CLINIC | Age: 65
End: 2025-03-27
Payer: MEDICAID

## 2025-03-31 PROCEDURE — RXMED WILLOW AMBULATORY MEDICATION CHARGE

## 2025-04-02 ENCOUNTER — PHARMACY VISIT (OUTPATIENT)
Dept: PHARMACY | Facility: CLINIC | Age: 65
End: 2025-04-02
Payer: MEDICAID

## 2025-04-21 PROCEDURE — RXMED WILLOW AMBULATORY MEDICATION CHARGE

## 2025-04-22 ENCOUNTER — PHARMACY VISIT (OUTPATIENT)
Dept: PHARMACY | Facility: CLINIC | Age: 65
End: 2025-04-22
Payer: MEDICAID

## 2025-04-22 DIAGNOSIS — L29.9 PRURITUS: ICD-10-CM

## 2025-04-22 PROCEDURE — RXMED WILLOW AMBULATORY MEDICATION CHARGE

## 2025-04-24 ENCOUNTER — PHARMACY VISIT (OUTPATIENT)
Dept: PHARMACY | Facility: CLINIC | Age: 65
End: 2025-04-24
Payer: MEDICAID

## 2025-04-26 RX ORDER — CETIRIZINE HYDROCHLORIDE 10 MG/1
10 TABLET ORAL DAILY
Qty: 30 TABLET | Refills: 11 | Status: SHIPPED | OUTPATIENT
Start: 2025-04-26 | End: 2026-04-26

## 2025-04-27 PROCEDURE — RXMED WILLOW AMBULATORY MEDICATION CHARGE

## 2025-04-30 ENCOUNTER — PHARMACY VISIT (OUTPATIENT)
Dept: PHARMACY | Facility: CLINIC | Age: 65
End: 2025-04-30
Payer: MEDICAID

## 2025-05-22 DIAGNOSIS — E11.3213 TYPE 2 DIABETES MELLITUS WITH BOTH EYES AFFECTED BY MILD NONPROLIFERATIVE RETINOPATHY AND MACULAR EDEMA, WITHOUT LONG-TERM CURRENT USE OF INSULIN: ICD-10-CM

## 2025-05-22 RX ORDER — METFORMIN HYDROCHLORIDE 1000 MG/1
1000 TABLET ORAL DAILY
Qty: 30 TABLET | Refills: 3 | OUTPATIENT
Start: 2025-05-22 | End: 2025-09-19

## 2025-05-27 ENCOUNTER — OFFICE VISIT (OUTPATIENT)
Facility: HOSPITAL | Age: 65
End: 2025-05-27
Payer: MEDICAID

## 2025-05-27 VITALS
SYSTOLIC BLOOD PRESSURE: 148 MMHG | OXYGEN SATURATION: 98 % | DIASTOLIC BLOOD PRESSURE: 87 MMHG | HEART RATE: 49 BPM | HEIGHT: 64 IN | TEMPERATURE: 96.9 F | BODY MASS INDEX: 29.09 KG/M2 | WEIGHT: 170.4 LBS

## 2025-05-27 DIAGNOSIS — L29.9 PRURITUS: ICD-10-CM

## 2025-05-27 DIAGNOSIS — C73 FOLLICULAR CARCINOMA OF THYROID GLAND: ICD-10-CM

## 2025-05-27 DIAGNOSIS — E11.3213 TYPE 2 DIABETES MELLITUS WITH BOTH EYES AFFECTED BY MILD NONPROLIFERATIVE RETINOPATHY AND MACULAR EDEMA, WITHOUT LONG-TERM CURRENT USE OF INSULIN: ICD-10-CM

## 2025-05-27 DIAGNOSIS — R00.1 BRADYCARDIA: ICD-10-CM

## 2025-05-27 DIAGNOSIS — J45.909 ASTHMA, UNSPECIFIED ASTHMA SEVERITY, UNSPECIFIED WHETHER COMPLICATED, UNSPECIFIED WHETHER PERSISTENT (HHS-HCC): Primary | ICD-10-CM

## 2025-05-27 DIAGNOSIS — R55 SYNCOPE, UNSPECIFIED SYNCOPE TYPE: ICD-10-CM

## 2025-05-27 PROCEDURE — RXMED WILLOW AMBULATORY MEDICATION CHARGE

## 2025-05-27 PROCEDURE — 93005 ELECTROCARDIOGRAM TRACING: CPT

## 2025-05-27 RX ORDER — ALBUTEROL SULFATE 90 UG/1
2 INHALANT RESPIRATORY (INHALATION) EVERY 6 HOURS PRN
Qty: 18 G | Refills: 11 | Status: SHIPPED | OUTPATIENT
Start: 2025-05-27

## 2025-05-27 RX ORDER — METFORMIN HYDROCHLORIDE 1000 MG/1
1000 TABLET ORAL DAILY
Qty: 30 TABLET | Refills: 3 | Status: SHIPPED | OUTPATIENT
Start: 2025-05-27 | End: 2025-09-24

## 2025-05-27 RX ORDER — HYDROXYZINE HYDROCHLORIDE 10 MG/1
10 TABLET, FILM COATED ORAL AS NEEDED
Qty: 30 TABLET | Refills: 0 | Status: SHIPPED | OUTPATIENT
Start: 2025-05-27

## 2025-05-27 RX ORDER — PSYLLIUM HUSK 0.4 G
600 CAPSULE ORAL DAILY
Qty: 30 TABLET | Refills: 11 | Status: SHIPPED | OUTPATIENT
Start: 2025-05-27 | End: 2026-05-27

## 2025-05-27 ASSESSMENT — PAIN SCALES - GENERAL: PAINLEVEL_OUTOF10: 0-NO PAIN

## 2025-05-27 NOTE — PROGRESS NOTES
Subjective   Patient ID: Katherine Sin is a 64 y.o. female who presents for Follow-up and Med Refill.    HPI  Patient presents requesting refills.     #Falls  - states she has had multiple falls over the last several months, most recently 3 weeks ago where she hit her head. She is unaware of preceding symptoms, but denied any lightheadedness or weakness. She denied chest pain or palpitations. She said she does not know what happens, she just falls backwards. She also cannot describe if she lost consciousness, but does endorse hitting her head on the last fall. She denied any current symptoms including headache, vision changes, lightheadedness, dizziness, numbness, tingling, or weakness.     Problem List[1]   Current Outpatient Medications   Medication Instructions    albuterol 90 mcg/actuation inhaler Inhale.    alcohol swabs pads, medicated 1 each 3 times a day as needed to clean finger prior to glucose testing (for blood sugar measurement).    amLODIPine (NORVASC) 10 mg, oral, Daily    atorvastatin (Lipitor) 20 mg tablet TAKE 1 TABLET (20 MG) BY MOUTH ONCE DAILY.    bisacodyl (Dulcolax) 5 mg EC tablet TAKE 1 TABLET BY MOUTH ONCE DAILY AS NEEDED FOR CONSTIPATION    cetirizine (ZYRTEC) 10 mg, oral, Daily    gabapentin (NEURONTIN) 300 mg, oral, Nightly    hydrOXYzine HCL (ATARAX) 25 mg, oral, Nightly    hydrOXYzine HCL (ATARAX) 10 mg, oral, As needed, Can take 1 tablet during the day IF NEEDED for intense pruritus. Do not take if driving, cooking, or operating machinery.    levothyroxine (SYNTHROID, LEVOXYL) 125 mcg, oral, Daily, Take on an empty stomach at the same time each day, either 30 to 60 minutes prior to breakfast    lisinopril 40 mg tablet TAKE 1 TABLET BY MOUTH ONCE DAILY AS DIRECTED    metFORMIN (GLUCOPHAGE) 1,000 mg, oral, Daily      Surgical History[2]   Allergies[3]   Social History[4]   Family History[5]     Review of Systems  Ten point review of systems negative unless specified in HPI.  "    Objective   Vitals: /87 (BP Location: Right arm, Patient Position: Sitting, BP Cuff Size: Adult)   Pulse (!) 49   Temp 36.1 °C (96.9 °F) (Temporal)   Ht 1.626 m (5' 4\")   Wt 77.3 kg (170 lb 6.4 oz)   SpO2 98%   BMI 29.25 kg/m²      Physical Exam  General:  Well developed. Alert. No acute distress.  Skin:  Warm, dry. normal skin turgor. no rashes. no lesions.   Head: NCAT  EENT: MMM  Cardiovascular:  bradycardia with regular rhythm, normal S1 and S2, no gallops, no murmurs and no pericardial rub.  Pulmonary:  CTAB in all fields  Abdomen:  (+) BS. soft. non-tender. non-distended. no abdominal masses. no guarding or rigidity.  Neurologic:  grossly intact  Musculoskeletal: moves all extremities spontaneously  Psychiatric:  appropriate mood and affect    Assessment/Plan   65 yo F with a PMH of thyroid follicular carcinoma, DM, and htn presenting for complaint of multiple falls. She is unaware of preceding symptoms. Unclear if consciousness is maintained throughout the episodes due to difficulties in discerning history, raising suspicion for neurologic causes, including seizure. However, she does also present today with incidental bradycardia of 49, although does not have overt symptoms of this. She does also endorse adherence to levothyroxine and does not have other symptoms of hypothyroidism that would raise suspicion for this as a cause of her bradycardia.       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  -     calcium carbonate 600 mg calcium (1,500 mg) tablet; Take 1 tablet (1,500 mg) by mouth once daily.  -     Hemoglobin A1C; Future  -     CBC and Auto Differential; Future  -     Comprehensive metabolic panel; Future  -     Albumin-Creatinine Ratio, Urine Random; Future  -     Lipid panel; Future    Asthma, unspecified asthma severity, unspecified whether complicated, unspecified whether persistent ([facility])  -     albuterol 90 mcg/actuation inhaler; Inhale 2 puffs every 6 hours if needed for wheezing.    Pruritus  -     hydrOXYzine HCL (Atarax) 10 mg tablet; Take 1 tablet (10 mg) by mouth if needed for itching. Can take 1 tablet during the day IF NEEDED for intense pruritus. Do not take if driving, cooking, or operating machinery.        RTC in 1 mo, or earlier as needed.  Attending Supervision: seen and discussed with attending physician Dr. Mallika Antunez.  Brendan Smith MD  Family Medicine Resident.           [1]   Patient Active Problem List  Diagnosis    Depression    Dyslipidemia    Esophageal reflux    Follicular carcinoma of thyroid gland    Emphysema lung (Multi)    Hypothyroidism, postsurgical    Low back pain    Anemia    Bradycardia    Cataract of both eyes    Chronic urticaria    Heart failure with preserved left ventricular function    Hemorrhoids    Lumbar degenerative disc disease    Liver cyst    Lung nodules    MGD (meibomian gland dysfunction)    Nicotine dependence    Obesity, Class I, BMI 30-34.9    Hypertension    Peripheral vascular disease    Phobia to [facility]    Schisis association    Seasonal allergic rhinitis    Vitamin D insufficiency    Excessive drinking of alcohol    Vocal cord  dysfunction    Pseudophakia, left eye    Dermatosis papulosa nigra    Osteopenia of multiple sites    Cyst of retina    Degenerative myopia of right eye    Annual physical exam    Type 2 diabetes mellitus with both eyes affected by mild nonproliferative retinopathy and macular edema, without long-term current use of insulin   [2]   Past Surgical History:  Procedure Laterality Date    CARDIAC CATHETERIZATION  07/23/2013    Cardiac Cath Procedure Outcome:    CATARACT EXTRACTION      COLONOSCOPY  06/24/2013    Complete Colonoscopy    THYROID SURGERY  07/23/2013    Thyroid Surgery   [3]   Allergies  Allergen Reactions    Hydromorphone Itching and Other    Ibuprofen Itching and Other    Topiramate Itching and Other    Tramadol Itching and Other   [4]   Social History  Tobacco Use    Smoking status: Every Day     Current packs/day: 0.50     Average packs/day: 0.5 packs/day for 50.0 years (25.0 ttl pk-yrs)     Types: Cigarettes     Passive exposure: Never    Smokeless tobacco: Never    Tobacco comments:     3 packs in a week   Vaping Use    Vaping status: Never Used   Substance Use Topics    Alcohol use: Yes     Alcohol/week: 9.0 standard drinks of alcohol     Types: 7 Cans of beer, 2 Shots of liquor per week     Comment: drink beer every day    Drug use: Never   [5]   Family History  Problem Relation Name Age of Onset    Diabetes Sister      Other (TYPE C VIRAL HEPATITIS) Brother

## 2025-05-28 NOTE — PROGRESS NOTES
"FUV for thyroid cancer. LV with me 2024    History of Present Illness  64 y.o. female with hx of PTC s/p total thyroidectomy (2011) and GRANADOS (2012), post-surgical hypothyroidism, and type 2 diabetes.    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    Today:  -not sleeping well  -wants an aid at night (currently has on during th day)    ROS  General: no fever or chills  CV: no chest pain   Respiratory: no shortness of breath  MSK: no lower extremity edema  Neuro: no headache or dizziness  See HPI for Endocrine ROS    Physical Exam   height is 1.626 m (5' 4\") and weight is 75.3 kg (166 lb). Her blood pressure is 149/85 and her pulse is 59.   General: not in acute distress  HEENT: ALISHA ESCOBAR  Thyroid: s/p total thyroidectomy  Neuro: alert and oriented x 3    Current Outpatient Medications   Medication Sig Dispense Refill    albuterol 90 mcg/actuation inhaler Inhale 2 puffs every 6 hours if needed for wheezing. 18 g 11    amLODIPine (Norvasc) 10 mg tablet Take 1 tablet (10 mg) by mouth once daily. 90 tablet 3    atorvastatin (Lipitor) 20 mg tablet TAKE 1 TABLET (20 MG) BY MOUTH ONCE DAILY. 90 tablet 2    calcium carbonate 600 mg calcium (1,500 mg) tablet Take 1 tablet (1,500 mg) by mouth once daily. 30 tablet 11    cetirizine (ZyrTEC) 10 mg tablet Take 1 tablet (10 mg) by mouth once daily. 30 tablet 11    gabapentin (Neurontin) 300 mg capsule Take 1 capsule (300 mg) by mouth once daily at bedtime. 30 capsule 11    hydrOXYzine HCL (Atarax) 10 mg tablet Take 1 tablet (10 " mg) by mouth if needed for itching. Can take 1 tablet during the day IF NEEDED for intense pruritus. Do not take if driving, cooking, or operating machinery. 30 tablet 0    levothyroxine (Synthroid, Levoxyl) 125 mcg tablet Take 1 tablet (125 mcg) by mouth early in the morning.. Take on an empty stomach at the same time each day, either 30 to 60 minutes prior to breakfast 90 tablet 3    lisinopril 40 mg tablet TAKE 1 TABLET BY MOUTH ONCE DAILY AS DIRECTED 90 tablet 3    metFORMIN (Glucophage) 1,000 mg tablet Take 1 tablet (1,000 mg) by mouth once daily. 30 tablet 3    alcohol swabs pads, medicated 1 each 3 times a day as needed to clean finger prior to glucose testing (for blood sugar measurement). 100 each 3    bisacodyl (Dulcolax) 5 mg EC tablet TAKE 1 TABLET BY MOUTH ONCE DAILY AS NEEDED FOR CONSTIPATION 30 tablet 11     No current facility-administered medications for this visit.      Latest Reference Range & Units 10/27/21 10:31 02/18/22 10:03 08/19/22 11:10 01/13/23 09:40 02/24/23 10:00 08/18/23 09:25   Thyroid Stimulating Hormone 0.44 - 3.98 mIU/L 0.30 (L) 0.29 (L) 0.14 (L) 0.04 (L) 0.82 0.05 (L)   Thyroglobulin 1.3 - 31.8 ng/mL 0.1 (L) 0.1 (L) 0.1 (L)  0.1 (L) 0.1 (L)   Thyroglobulin LC-MS/MS 1.3 - 31.8 ng/mL Not Applicable Not Applicable Not Applicable  Not Applicable Not Applicable   Anti-Thyroglobulin AB 0.0 - 4.0 IU/mL <0.9 <0.9 <0.9  <0.9 <0.9      Latest Reference Range & Units 05/27/25 10:55 06/04/25 11:36   TSH 0.40 - 4.50 mIU/L 0.03 (L) 0.02 (L)   THYROGLOBULIN ng/mL <0.1 <0.1   THYROGLOBULIN ANTIBODIES <=1 IU/mL <1 <1     Assessment and Plan  64 y.o. female with hx of PTC s/p total thyroidectomy (12/2011) and GRANADOS (01/2012), post-surgical hypothyroidism, and type 2 diabetes.    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  TSH lower than goal.  Levothyroxine is prescribed as 125 mcg/day, but the patient is adamant that she has been taking 100 mcg/day. Confirmed with [facility] pharmacy that she last filled her levothyroxine in 2025 and it was the 125 mcg dose.  Will decrease levothyroxine.    PLAN:  -decrease levothyroxine to 125 mcg, 6 tabs/week  -repeat TSH before next visit  -TSH goal ~0.5  -diabetes managed by PCP    Follow-up in 3 months with Dr. Wilcox to establish care (labs prior)

## 2025-05-29 ENCOUNTER — PHARMACY VISIT (OUTPATIENT)
Dept: PHARMACY | Facility: CLINIC | Age: 65
End: 2025-05-29
Payer: MEDICAID

## 2025-05-29 LAB
ALBUMIN SERPL-MCNC: 4.4 G/DL (ref 3.6–5.1)
ALBUMIN/CREAT UR: NORMAL MG/G CREAT
ALP SERPL-CCNC: 81 U/L (ref 37–153)
ALT SERPL-CCNC: 14 U/L (ref 6–29)
ANION GAP SERPL CALCULATED.4IONS-SCNC: 10 MMOL/L (CALC) (ref 7–17)
AST SERPL-CCNC: 15 U/L (ref 10–35)
BASOPHILS # BLD AUTO: 40 CELLS/UL (ref 0–200)
BASOPHILS NFR BLD AUTO: 0.6 %
BILIRUB SERPL-MCNC: 0.4 MG/DL (ref 0.2–1.2)
BUN SERPL-MCNC: 12 MG/DL (ref 7–25)
CALCIUM SERPL-MCNC: 10.1 MG/DL (ref 8.6–10.4)
CHLORIDE SERPL-SCNC: 112 MMOL/L (ref 98–110)
CHOLEST SERPL-MCNC: 150 MG/DL
CHOLEST/HDLC SERPL: 2.2 (CALC)
CO2 SERPL-SCNC: 23 MMOL/L (ref 20–32)
CREAT SERPL-MCNC: 0.68 MG/DL (ref 0.5–1.05)
CREAT UR-MCNC: 58 MG/DL (ref 20–275)
EGFRCR SERPLBLD CKD-EPI 2021: 96 ML/MIN/1.73M2
EOSINOPHIL # BLD AUTO: 67 CELLS/UL (ref 15–500)
EOSINOPHIL NFR BLD AUTO: 1 %
ERYTHROCYTE [DISTWIDTH] IN BLOOD BY AUTOMATED COUNT: 13.6 % (ref 11–15)
EST. AVERAGE GLUCOSE BLD GHB EST-MCNC: 114 MG/DL
EST. AVERAGE GLUCOSE BLD GHB EST-SCNC: 6.3 MMOL/L
GLUCOSE SERPL-MCNC: 78 MG/DL (ref 65–99)
HBA1C MFR BLD: 5.6 %
HCT VFR BLD AUTO: 43.8 % (ref 35–45)
HDLC SERPL-MCNC: 69 MG/DL
HGB BLD-MCNC: 13.7 G/DL (ref 11.7–15.5)
LDLC SERPL CALC-MCNC: 64 MG/DL (CALC)
LYMPHOCYTES # BLD AUTO: 2057 CELLS/UL (ref 850–3900)
LYMPHOCYTES NFR BLD AUTO: 30.7 %
MCH RBC QN AUTO: 28.8 PG (ref 27–33)
MCHC RBC AUTO-ENTMCNC: 31.3 G/DL (ref 32–36)
MCV RBC AUTO: 92 FL (ref 80–100)
MICROALBUMIN UR-MCNC: <0.2 MG/DL
MONOCYTES # BLD AUTO: 596 CELLS/UL (ref 200–950)
MONOCYTES NFR BLD AUTO: 8.9 %
NEUTROPHILS # BLD AUTO: 3940 CELLS/UL (ref 1500–7800)
NEUTROPHILS NFR BLD AUTO: 58.8 %
NONHDLC SERPL-MCNC: 81 MG/DL (CALC)
PLATELET # BLD AUTO: 340 THOUSAND/UL (ref 140–400)
PMV BLD REES-ECKER: 11.4 FL (ref 7.5–12.5)
POTASSIUM SERPL-SCNC: 4.8 MMOL/L (ref 3.5–5.3)
PROT SERPL-MCNC: 6.9 G/DL (ref 6.1–8.1)
RBC # BLD AUTO: 4.76 MILLION/UL (ref 3.8–5.1)
SODIUM SERPL-SCNC: 145 MMOL/L (ref 135–146)
THYROGLOB AB SERPL-ACNC: <1 IU/ML
THYROGLOB SERPL-MCNC: <0.1 NG/ML
TRIGL SERPL-MCNC: 85 MG/DL
TSH SERPL-ACNC: 0.03 MIU/L (ref 0.4–4.5)
WBC # BLD AUTO: 6.7 THOUSAND/UL (ref 3.8–10.8)

## 2025-06-08 LAB
THYROGLOB AB SERPL-ACNC: <1 IU/ML
THYROGLOB SERPL-MCNC: <0.1 NG/ML
TSH SERPL-ACNC: 0.02 MIU/L (ref 0.4–4.5)

## 2025-06-09 ENCOUNTER — TELEPHONE (OUTPATIENT)
Facility: HOSPITAL | Age: 65
End: 2025-06-09

## 2025-06-09 ENCOUNTER — APPOINTMENT (OUTPATIENT)
Dept: ENDOCRINOLOGY | Facility: CLINIC | Age: 65
End: 2025-06-09
Payer: MEDICAID

## 2025-06-09 VITALS
WEIGHT: 166 LBS | BODY MASS INDEX: 28.34 KG/M2 | DIASTOLIC BLOOD PRESSURE: 85 MMHG | HEIGHT: 64 IN | SYSTOLIC BLOOD PRESSURE: 149 MMHG | HEART RATE: 59 BPM

## 2025-06-09 DIAGNOSIS — E89.0 HYPOTHYROIDISM, POSTSURGICAL: Primary | ICD-10-CM

## 2025-06-09 PROCEDURE — 3077F SYST BP >= 140 MM HG: CPT | Performed by: STUDENT IN AN ORGANIZED HEALTH CARE EDUCATION/TRAINING PROGRAM

## 2025-06-09 PROCEDURE — 99214 OFFICE O/P EST MOD 30 MIN: CPT | Performed by: STUDENT IN AN ORGANIZED HEALTH CARE EDUCATION/TRAINING PROGRAM

## 2025-06-09 PROCEDURE — 3079F DIAST BP 80-89 MM HG: CPT | Performed by: STUDENT IN AN ORGANIZED HEALTH CARE EDUCATION/TRAINING PROGRAM

## 2025-06-09 PROCEDURE — 1159F MED LIST DOCD IN RCRD: CPT | Performed by: STUDENT IN AN ORGANIZED HEALTH CARE EDUCATION/TRAINING PROGRAM

## 2025-06-09 PROCEDURE — 4010F ACE/ARB THERAPY RXD/TAKEN: CPT | Performed by: STUDENT IN AN ORGANIZED HEALTH CARE EDUCATION/TRAINING PROGRAM

## 2025-06-09 PROCEDURE — 1126F AMNT PAIN NOTED NONE PRSNT: CPT | Performed by: STUDENT IN AN ORGANIZED HEALTH CARE EDUCATION/TRAINING PROGRAM

## 2025-06-09 PROCEDURE — 3008F BODY MASS INDEX DOCD: CPT | Performed by: STUDENT IN AN ORGANIZED HEALTH CARE EDUCATION/TRAINING PROGRAM

## 2025-06-09 RX ORDER — LEVOTHYROXINE SODIUM 125 UG/1
TABLET ORAL
Qty: 90 TABLET | Refills: 1 | Status: SHIPPED | OUTPATIENT
Start: 2025-06-09

## 2025-06-09 ASSESSMENT — PAIN SCALES - GENERAL: PAINLEVEL_OUTOF10: 0-NO PAIN

## 2025-06-09 NOTE — TELEPHONE ENCOUNTER
Pt came into the office today and requested for her vitamins to be sent over to the pharmacy. She also stated that you missed a prescription please give the patient a callback about this matter

## 2025-06-14 ENCOUNTER — CLINICAL SUPPORT (OUTPATIENT)
Dept: EMERGENCY MEDICINE | Facility: HOSPITAL | Age: 65
End: 2025-06-14
Payer: MEDICAID

## 2025-06-14 ENCOUNTER — APPOINTMENT (OUTPATIENT)
Dept: RADIOLOGY | Facility: HOSPITAL | Age: 65
End: 2025-06-14
Payer: MEDICAID

## 2025-06-14 ENCOUNTER — HOSPITAL ENCOUNTER (EMERGENCY)
Facility: HOSPITAL | Age: 65
Discharge: HOME | End: 2025-06-15
Attending: EMERGENCY MEDICINE
Payer: MEDICAID

## 2025-06-14 DIAGNOSIS — R06.02 SHORTNESS OF BREATH: Primary | ICD-10-CM

## 2025-06-14 LAB
ALBUMIN SERPL BCP-MCNC: 4 G/DL (ref 3.4–5)
ALP SERPL-CCNC: 75 U/L (ref 33–136)
ALT SERPL W P-5'-P-CCNC: 12 U/L (ref 7–45)
ANION GAP BLDV CALCULATED.4IONS-SCNC: 17 MMOL/L (ref 10–25)
ANION GAP SERPL CALC-SCNC: 20 MMOL/L (ref 10–20)
AST SERPL W P-5'-P-CCNC: 14 U/L (ref 9–39)
ATRIAL RATE: 78 BPM
BASE EXCESS BLDV CALC-SCNC: -3.3 MMOL/L (ref -2–3)
BASOPHILS # BLD AUTO: 0.05 X10*3/UL (ref 0–0.1)
BASOPHILS NFR BLD AUTO: 0.4 %
BILIRUB SERPL-MCNC: 0.3 MG/DL (ref 0–1.2)
BNP SERPL-MCNC: 18 PG/ML (ref 0–99)
BODY TEMPERATURE: 37 DEGREES CELSIUS
BUN SERPL-MCNC: 8 MG/DL (ref 6–23)
CA-I BLDV-SCNC: 1.29 MMOL/L (ref 1.1–1.33)
CALCIUM SERPL-MCNC: 10.1 MG/DL (ref 8.6–10.6)
CARDIAC TROPONIN I PNL SERPL HS: 3 NG/L (ref 0–34)
CHLORIDE BLDV-SCNC: 105 MMOL/L (ref 98–107)
CHLORIDE SERPL-SCNC: 104 MMOL/L (ref 98–107)
CO2 SERPL-SCNC: 19 MMOL/L (ref 21–32)
CREAT SERPL-MCNC: 0.69 MG/DL (ref 0.5–1.05)
D DIMER PPP FEU-MCNC: 443 NG/ML FEU
EGFRCR SERPLBLD CKD-EPI 2021: >90 ML/MIN/1.73M*2
EOSINOPHIL # BLD AUTO: 0.1 X10*3/UL (ref 0–0.7)
EOSINOPHIL NFR BLD AUTO: 0.9 %
ERYTHROCYTE [DISTWIDTH] IN BLOOD BY AUTOMATED COUNT: 13.9 % (ref 11.5–14.5)
GLUCOSE BLD MANUAL STRIP-MCNC: 124 MG/DL (ref 74–99)
GLUCOSE BLDV-MCNC: 127 MG/DL (ref 74–99)
GLUCOSE SERPL-MCNC: 114 MG/DL (ref 74–99)
HCO3 BLDV-SCNC: 20.1 MMOL/L (ref 22–26)
HCT VFR BLD AUTO: 38.5 % (ref 36–46)
HCT VFR BLD EST: 43 % (ref 36–46)
HGB BLD-MCNC: 13.7 G/DL (ref 12–16)
HGB BLDV-MCNC: 14.3 G/DL (ref 12–16)
IMM GRANULOCYTES # BLD AUTO: 0.04 X10*3/UL (ref 0–0.7)
IMM GRANULOCYTES NFR BLD AUTO: 0.3 % (ref 0–0.9)
LACTATE BLDV-SCNC: 4.4 MMOL/L (ref 0.4–2)
LYMPHOCYTES # BLD AUTO: 3.54 X10*3/UL (ref 1.2–4.8)
LYMPHOCYTES NFR BLD AUTO: 31 %
MAGNESIUM SERPL-MCNC: 1.71 MG/DL (ref 1.6–2.4)
MCH RBC QN AUTO: 29.1 PG (ref 26–34)
MCHC RBC AUTO-ENTMCNC: 35.6 G/DL (ref 32–36)
MCV RBC AUTO: 82 FL (ref 80–100)
MONOCYTES # BLD AUTO: 0.8 X10*3/UL (ref 0.1–1)
MONOCYTES NFR BLD AUTO: 7 %
NEUTROPHILS # BLD AUTO: 6.9 X10*3/UL (ref 1.2–7.7)
NEUTROPHILS NFR BLD AUTO: 60.4 %
NRBC BLD-RTO: 0 /100 WBCS (ref 0–0)
OXYHGB MFR BLDV: 86.3 % (ref 45–75)
P AXIS: 47 DEGREES
P OFFSET: 187 MS
P ONSET: 127 MS
PCO2 BLDV: 31 MM HG (ref 41–51)
PH BLDV: 7.42 PH (ref 7.33–7.43)
PLATELET # BLD AUTO: 386 X10*3/UL (ref 150–450)
PO2 BLDV: 73 MM HG (ref 35–45)
POTASSIUM BLDV-SCNC: 3.3 MMOL/L (ref 3.5–5.3)
POTASSIUM SERPL-SCNC: 3.2 MMOL/L (ref 3.5–5.3)
PR INTERVAL: 168 MS
PROT SERPL-MCNC: 6.9 G/DL (ref 6.4–8.2)
Q ONSET: 211 MS
QRS COUNT: 13 BEATS
QRS DURATION: 92 MS
QT INTERVAL: 386 MS
QTC CALCULATION(BAZETT): 440 MS
QTC FREDERICIA: 421 MS
R AXIS: -32 DEGREES
RBC # BLD AUTO: 4.71 X10*6/UL (ref 4–5.2)
SAO2 % BLDV: 95 % (ref 45–75)
SODIUM BLDV-SCNC: 139 MMOL/L (ref 136–145)
SODIUM SERPL-SCNC: 140 MMOL/L (ref 136–145)
T AXIS: 34 DEGREES
T OFFSET: 404 MS
VENTRICULAR RATE: 78 BPM
WBC # BLD AUTO: 11.4 X10*3/UL (ref 4.4–11.3)

## 2025-06-14 PROCEDURE — 82947 ASSAY GLUCOSE BLOOD QUANT: CPT

## 2025-06-14 PROCEDURE — 93005 ELECTROCARDIOGRAM TRACING: CPT

## 2025-06-14 PROCEDURE — 83735 ASSAY OF MAGNESIUM: CPT | Performed by: EMERGENCY MEDICINE

## 2025-06-14 PROCEDURE — 71046 X-RAY EXAM CHEST 2 VIEWS: CPT

## 2025-06-14 PROCEDURE — 85379 FIBRIN DEGRADATION QUANT: CPT

## 2025-06-14 PROCEDURE — 85025 COMPLETE CBC W/AUTO DIFF WBC: CPT | Performed by: EMERGENCY MEDICINE

## 2025-06-14 PROCEDURE — 84132 ASSAY OF SERUM POTASSIUM: CPT | Performed by: EMERGENCY MEDICINE

## 2025-06-14 PROCEDURE — 2500000004 HC RX 250 GENERAL PHARMACY W/ HCPCS (ALT 636 FOR OP/ED): Mod: SE

## 2025-06-14 PROCEDURE — 99285 EMERGENCY DEPT VISIT HI MDM: CPT | Mod: 25 | Performed by: EMERGENCY MEDICINE

## 2025-06-14 PROCEDURE — 84132 ASSAY OF SERUM POTASSIUM: CPT

## 2025-06-14 PROCEDURE — 96360 HYDRATION IV INFUSION INIT: CPT

## 2025-06-14 PROCEDURE — 82435 ASSAY OF BLOOD CHLORIDE: CPT

## 2025-06-14 PROCEDURE — 96361 HYDRATE IV INFUSION ADD-ON: CPT

## 2025-06-14 PROCEDURE — 71046 X-RAY EXAM CHEST 2 VIEWS: CPT | Performed by: RADIOLOGY

## 2025-06-14 PROCEDURE — 36415 COLL VENOUS BLD VENIPUNCTURE: CPT

## 2025-06-14 PROCEDURE — 84484 ASSAY OF TROPONIN QUANT: CPT | Performed by: EMERGENCY MEDICINE

## 2025-06-14 PROCEDURE — 83880 ASSAY OF NATRIURETIC PEPTIDE: CPT | Performed by: EMERGENCY MEDICINE

## 2025-06-14 RX ORDER — IPRATROPIUM BROMIDE AND ALBUTEROL SULFATE 2.5; .5 MG/3ML; MG/3ML
3 SOLUTION RESPIRATORY (INHALATION)
Status: CANCELLED | OUTPATIENT
Start: 2025-06-15

## 2025-06-14 RX ORDER — IPRATROPIUM BROMIDE AND ALBUTEROL SULFATE 2.5; .5 MG/3ML; MG/3ML
SOLUTION RESPIRATORY (INHALATION)
Status: COMPLETED
Start: 2025-06-14 | End: 2025-06-14

## 2025-06-14 RX ADMIN — SODIUM CHLORIDE, SODIUM LACTATE, POTASSIUM CHLORIDE, AND CALCIUM CHLORIDE 500 ML: .6; .31; .03; .02 INJECTION, SOLUTION INTRAVENOUS at 22:11

## 2025-06-14 ASSESSMENT — LIFESTYLE VARIABLES
EVER HAD A DRINK FIRST THING IN THE MORNING TO STEADY YOUR NERVES TO GET RID OF A HANGOVER: NO
HAVE PEOPLE ANNOYED YOU BY CRITICIZING YOUR DRINKING: NO
TOTAL SCORE: 0
EVER FELT BAD OR GUILTY ABOUT YOUR DRINKING: NO
HAVE YOU EVER FELT YOU SHOULD CUT DOWN ON YOUR DRINKING: NO

## 2025-06-14 ASSESSMENT — PAIN - FUNCTIONAL ASSESSMENT: PAIN_FUNCTIONAL_ASSESSMENT: 0-10

## 2025-06-14 ASSESSMENT — PAIN DESCRIPTION - PAIN TYPE: TYPE: ACUTE PAIN

## 2025-06-15 VITALS
BODY MASS INDEX: 28.34 KG/M2 | DIASTOLIC BLOOD PRESSURE: 79 MMHG | SYSTOLIC BLOOD PRESSURE: 120 MMHG | HEIGHT: 64 IN | WEIGHT: 166 LBS | HEART RATE: 73 BPM | RESPIRATION RATE: 18 BRPM | TEMPERATURE: 98.2 F | OXYGEN SATURATION: 94 %

## 2025-06-15 LAB
ANION GAP BLDV CALCULATED.4IONS-SCNC: 12 MMOL/L (ref 10–25)
BASE EXCESS BLDV CALC-SCNC: -2.5 MMOL/L (ref -2–3)
BODY TEMPERATURE: 37 DEGREES CELSIUS
CA-I BLDV-SCNC: 1.27 MMOL/L (ref 1.1–1.33)
CARDIAC TROPONIN I PNL SERPL HS: 3 NG/L (ref 0–34)
CHLORIDE BLDV-SCNC: 107 MMOL/L (ref 98–107)
GLUCOSE BLDV-MCNC: 89 MG/DL (ref 74–99)
HCO3 BLDV-SCNC: 21.7 MMOL/L (ref 22–26)
HCT VFR BLD EST: 40 % (ref 36–46)
HGB BLDV-MCNC: 13.4 G/DL (ref 12–16)
INHALED O2 CONCENTRATION: 21 %
LACTATE BLDV-SCNC: 3.6 MMOL/L (ref 0.4–2)
OXYHGB MFR BLDV: 69.4 % (ref 45–75)
PCO2 BLDV: 35 MM HG (ref 41–51)
PH BLDV: 7.4 PH (ref 7.33–7.43)
PO2 BLDV: 46 MM HG (ref 35–45)
POTASSIUM BLDV-SCNC: 3.8 MMOL/L (ref 3.5–5.3)
SAO2 % BLDV: 74 % (ref 45–75)
SODIUM BLDV-SCNC: 137 MMOL/L (ref 136–145)

## 2025-06-15 PROCEDURE — 36415 COLL VENOUS BLD VENIPUNCTURE: CPT | Performed by: EMERGENCY MEDICINE

## 2025-06-15 PROCEDURE — 84132 ASSAY OF SERUM POTASSIUM: CPT

## 2025-06-15 PROCEDURE — 84484 ASSAY OF TROPONIN QUANT: CPT | Performed by: EMERGENCY MEDICINE

## 2025-06-15 PROCEDURE — 2500000001 HC RX 250 WO HCPCS SELF ADMINISTERED DRUGS (ALT 637 FOR MEDICARE OP): Mod: SE

## 2025-06-15 PROCEDURE — 36415 COLL VENOUS BLD VENIPUNCTURE: CPT

## 2025-06-15 RX ORDER — POTASSIUM CHLORIDE 1.5 G/1.58G
40 POWDER, FOR SOLUTION ORAL ONCE
Status: COMPLETED | OUTPATIENT
Start: 2025-06-15 | End: 2025-06-15

## 2025-06-15 RX ADMIN — POTASSIUM CHLORIDE 40 MEQ: 1.5 POWDER, FOR SOLUTION ORAL at 01:52

## 2025-06-15 ASSESSMENT — PAIN DESCRIPTION - DESCRIPTORS: DESCRIPTORS: ACHING

## 2025-06-15 NOTE — ED TRIAGE NOTES
Pt presents with SOB X a few days, 9/10 left sided sharp chest pain  generalized weakness and dizziness per EMS pt ws 92% on RA upon their arrival and her BP was hypotensive at 88/45. Upon arrival to ED pt reports that she has a PMHx of HTN, DM, and chronic resp. failure

## 2025-06-15 NOTE — ED PROVIDER NOTES
HPI   Chief Complaint   Patient presents with    Shortness of Breath       Patient is a 64-year-old female with chart and reported history of asthma, HTN (on amlodipine) papillary thyroid carcinoma s/p total thyroidectomy now with hypothyroidism, T2DM, EtOH use disorder brought in by EMS with concern for shortness of breath for several days.  Does report some left-sided chest pain and generalized weakness per EMS as well.  Activated as critical as reportedly hypotensive to 88/45 per EMS.  No lightheadedness or neurological symptoms reported.  Does not endorse any clear cause of symptoms, reports they have been gradually worse for the last several days.  Reports she is not on any breathing treatments for asthma at home but has been treated for it in the hospital in the past.  Does report she is on furosemide currently but denies active heart failure.  Does report she was diagnosed with CHF in the past but believes it resolved  Does report they have plans to place a Holter monitor for what she reports was a slow heart rate.  Denies fevers, chills, cough, congestion, infectious symptoms.  Denies leg swelling, history of blood clots.            Patient History   Medical History[1]  Surgical History[2]  Family History[3]  Social History[4]    Physical Exam   ED Triage Vitals [06/14/25 2154]   Temp Heart Rate Respirations BP   -- 84 20 --      Pulse Ox Temp src Heart Rate Source Patient Position   94 % -- Monitor Lying      BP Location FiO2 (%)     Left arm --       Physical Exam  Constitutional:       Appearance: Normal appearance.   HENT:      Head: Normocephalic and atraumatic.   Eyes:      Extraocular Movements: Extraocular movements intact.   Cardiovascular:      Rate and Rhythm: Normal rate.   Pulmonary:      Comments: Satting 94% on room air, no increased work of breathing, no appreciable wheeze.  Musculoskeletal:         General: Normal range of motion.      Cervical back: Normal range of motion.      Comments:  Calves and thighs symmetric bilaterally.  No appreciable edema.   Skin:     General: Skin is warm and dry.   Neurological:      General: No focal deficit present.      Mental Status: She is alert and oriented to person, place, and time.   Psychiatric:         Mood and Affect: Mood normal.         Behavior: Behavior normal.           ED Course & MDM   Diagnoses as of 06/15/25 2146   Shortness of breath                 No data recorded                                 Medical Decision Making  EKG shows a normal rate of 78bpm, normal sinus rhythm, leftward axis,  ms, QRS 92 ms, QTc 440 ms. Normal ST and T wave pattern with no evidence of acute ischemia or other acute findings.    Patient is a 64-year-old female with chart and reported history of asthma, HTN (on amlodipine) papillary thyroid carcinoma s/p total thyroidectomy now with hypothyroidism, T2DM, EtOH use disorder brought in by EMS with concern for shortness of breath for several days. Concern for prehospital hypotension but normotensive, asymptomatic from this perspective in ED.   Normoxia here even with ambulation and no clear etiology of shortness of breath.  Does have possible history of asthma and DuoNeb trialed with some subjective improvement but never had wheeze or clear evidence of obstructive etiology.  No significant infectious symptoms, chest x-ray with no consolidation.  No clinical evidence of DVT, low pretest probability of PE and D-dimer less than 500.  Cardiac workup otherwise reassuring with troponin 3, BNP 18.  Advised on close follow-up with primary care provider for further management of similar symptoms.  Asymptomatic on reassessment.   Return precautions discussed with patient and patient discharged home.    Patient seen and discussed with Dr. Vladislav Lee MD, PhD  Emergency Medicine PGY3          Procedure  Procedures         [1]   Past Medical History:  Diagnosis Date    Age-related nuclear cataract, left eye 10/12/2016     Age-related nuclear cataract of left eye    Age-related nuclear cataract, right eye 10/12/2016    Age-related nuclear cataract of right eye    Bradycardia     Congestive heart failure (CHF)     COPD (chronic obstructive pulmonary disease) (Multi)     Diabetes mellitus (Multi)     Hyperlipidemia     Hypertension     Hypothyroidism     Meibomian gland dysfunction of unspecified eye, unspecified eyelid 05/30/2014    MGD (meibomian gland dysfunction)    Menopausal and female climacteric states 06/11/2015    Menopausal symptoms    Myopia, unspecified eye 05/30/2014    Myopia with presbyopia    Pap smear abnormality of cervix with ASCUS favoring benign 10/20/2023    Personal history of malignant neoplasm of thyroid 06/12/2015    History of thyroid cancer    Pulmonary nodules     Round hole, left eye 10/31/2016    Round hole of left retina without detachment    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    Supraglottic stenosis   [2]   Past Surgical History:  Procedure Laterality Date    CARDIAC CATHETERIZATION  07/23/2013    Cardiac Cath Procedure Outcome:    CATARACT EXTRACTION      COLONOSCOPY  06/24/2013    Complete Colonoscopy    THYROID SURGERY  07/23/2013    Thyroid Surgery   [3]   Family History  Problem Relation Name Age of Onset    Diabetes Sister      Other (TYPE C VIRAL HEPATITIS) Brother     [4]   Social History  Tobacco Use    Smoking status: Every Day     Current packs/day: 0.50     Average packs/day: 0.5 packs/day for 50.0 years (25.0 ttl pk-yrs)     Types: Cigarettes     Passive exposure: Never    Smokeless tobacco: Never    Tobacco comments:     3 packs in a week   Vaping Use    Vaping status: Never Used   Substance Use Topics    Alcohol use: Yes     Alcohol/week: 9.0 standard drinks of alcohol     Types: 7 Cans of beer, 2 Shots of liquor per week     Comment: drink beer every day    Drug use: Never        Geovanny Lee MD  Resident  06/16/25 [id number]       Geovanny Lee MD  Resident  06/19/25 [id number]     MD  Resident  06/29/25 [id number]

## 2025-06-15 NOTE — DISCHARGE INSTRUCTIONS
We do not clearly have a cause of your shortness of breath here.  We would recommend continue using your inhaler at home for similar symptoms.  Please follow-up closely with your primary care provider on the 20th for reassessment and further workup.  Please return to ED if symptoms worsen or change.

## 2025-06-16 LAB — HOLD SPECIMEN: NORMAL

## 2025-06-20 ENCOUNTER — PHARMACY VISIT (OUTPATIENT)
Dept: PHARMACY | Facility: CLINIC | Age: 65
End: 2025-06-20
Payer: MEDICAID

## 2025-06-20 ENCOUNTER — OFFICE VISIT (OUTPATIENT)
Facility: HOSPITAL | Age: 65
End: 2025-06-20
Payer: MEDICAID

## 2025-06-20 VITALS
OXYGEN SATURATION: 98 % | HEART RATE: 76 BPM | DIASTOLIC BLOOD PRESSURE: 84 MMHG | SYSTOLIC BLOOD PRESSURE: 138 MMHG | HEIGHT: 65 IN | BODY MASS INDEX: 27.66 KG/M2 | TEMPERATURE: 96.3 F | WEIGHT: 166 LBS

## 2025-06-20 DIAGNOSIS — K59.00 CONSTIPATION, UNSPECIFIED CONSTIPATION TYPE: ICD-10-CM

## 2025-06-20 PROCEDURE — RXMED WILLOW AMBULATORY MEDICATION CHARGE

## 2025-06-20 RX ORDER — BISACODYL 5 MG
TABLET, DELAYED RELEASE (ENTERIC COATED) ORAL
Qty: 30 TABLET | Refills: 11 | Status: SHIPPED | OUTPATIENT
Start: 2025-06-20 | End: 2025-07-20

## 2025-06-20 ASSESSMENT — PAIN SCALES - GENERAL: PAINLEVEL_OUTOF10: 0-NO PAIN

## 2025-06-20 NOTE — LETTER
This letter is to confirm that Katherine Sin (: 1960) requires a home aide as a result of her medical conditions. Her son, Nj Sin, serves this purpose for her. For her safety, he must be able to reside in her home when necessary, including nights, without any limitations to access of her home.     Please do not hesitate to contact my office if you have any questions, or require further clarification.     Thank you for your assistance in this manner.       Brendan Smith MD  Family Medicine Resident.

## 2025-06-20 NOTE — PROGRESS NOTES
"Subjective   Patient ID: Katherine Sin is a 64 y.o. female who presents for Follow-up.    HPI  #ED follow-up   #SOB  - improved  - workup unremarkable in the ED and discharged home    #Falls  - persisting  - requesting letter of support for her son to remain in her home as caregiver    Problem List[1]   Current Outpatient Medications   Medication Instructions    albuterol 90 mcg/actuation inhaler 2 puffs, inhalation, Every 6 hours PRN    alcohol swabs pads, medicated 1 each 3 times a day as needed to clean finger prior to glucose testing (for blood sugar measurement).    amLODIPine (NORVASC) 10 mg, oral, Daily    atorvastatin (Lipitor) 20 mg tablet TAKE 1 TABLET (20 MG) BY MOUTH ONCE DAILY.    bisacodyl (Dulcolax) 5 mg EC tablet TAKE 1 TABLET BY MOUTH ONCE DAILY AS NEEDED FOR CONSTIPATION    calcium carbonate 1,500 mg, oral, Daily    cetirizine (ZYRTEC) 10 mg, oral, Daily    gabapentin (NEURONTIN) 300 mg, oral, Nightly    hydrOXYzine HCL (ATARAX) 10 mg, oral, As needed, Can take 1 tablet during the day IF NEEDED for intense pruritus. Do not take if driving, cooking, or operating machinery.    levothyroxine (Synthroid, Levoxyl) 125 mcg tablet Take 1 tab per day, Mon-Sat, SKIP Sun. Take on an empty stomach at the same time each day, either 30 to 60 minutes prior to breakfast    lisinopril 40 mg tablet TAKE 1 TABLET BY MOUTH ONCE DAILY AS DIRECTED    metFORMIN (GLUCOPHAGE) 1,000 mg, oral, Daily      Surgical History[2]   Allergies[3]   Social History[4]   Family History[5]     Review of Systems  Ten point review of systems negative unless specified in HPI.     Objective   Vitals: /84 (BP Location: Right arm, Patient Position: Sitting, BP Cuff Size: Adult)   Pulse 76   Temp 35.7 °C (96.3 °F) (Temporal)   Ht 1.638 m (5' 4.5\")   Wt 75.3 kg (166 lb)   SpO2 98%   BMI 28.05 kg/m²      Physical Exam  General:  Well developed. Alert. No acute distress.  Skin:  Warm, dry. normal skin turgor. no rashes. no " lesions.   Head: NCAT  EENT: MMM  Cardiovascular:  Regular rate and rhythm, normal S1 and S2, no gallops, no murmurs and no pericardial rub.  Pulmonary:  CTAB in all fields  Abdomen:  (+) BS. soft. non-tender. non-distended. no abdominal masses. no guarding or rigidity.  Neurologic:  grossly intact  Musculoskeletal: moves all extremities spontaneously  Psychiatric:  appropriate mood and affect    Assessment/Plan   65 yo F presenting for follow-up.     Falls        -     syncopal vs mechanical, unclear        -     CTH scheduled 6/27         -     holter and echo scheduled 6/26         -     provided requested documentation    Dyspnea  Hx of asthma         -     ED course and work-up reviewed          -     likely iso mild asthma exacerbation given unremarkable work-up          -     continue albuterol PRN         -     consider PFTs pending workup for falls    Constipation, unspecified constipation type  -     bisacodyl (Dulcolax) 5 mg EC tablet; TAKE 1 TABLET BY MOUTH ONCE DAILY AS NEEDED FOR CONSTIPATION      RTC in 1-2 mos, or earlier as needed.  Attending Supervision: discussed with attending physician Dr. Nahomy Rodriguez.  Brendan Smith MD  Family Medicine Resident.           [1]   Patient Active Problem List  Diagnosis    Depression    Dyslipidemia    Esophageal reflux    Follicular carcinoma of thyroid gland    Emphysema lung (Multi)    Hypothyroidism, postsurgical    Low back pain    Anemia    Bradycardia    Cataract of both eyes    Chronic urticaria    Heart failure with preserved left ventricular function    Hemorrhoids    Lumbar degenerative disc disease    Liver cyst    Lung nodules    MGD (meibomian gland dysfunction)    Nicotine dependence    Obesity, Class I, BMI 30-34.9    Hypertension    Peripheral vascular disease    Phobia to [facility]    Schisis association    Seasonal allergic rhinitis    Vitamin D insufficiency    Excessive drinking of alcohol    Vocal cord dysfunction    Pseudophakia, left eye     Dermatosis papulosa nigra    Osteopenia of multiple sites    Cyst of retina    Degenerative myopia of right eye    Annual physical exam    Type 2 diabetes mellitus with both eyes affected by mild nonproliferative retinopathy and macular edema, without long-term current use of insulin   [2]   Past Surgical History:  Procedure Laterality Date    CARDIAC CATHETERIZATION  07/23/2013    Cardiac Cath Procedure Outcome:    CATARACT EXTRACTION      COLONOSCOPY  06/24/2013    Complete Colonoscopy    THYROID SURGERY  07/23/2013    Thyroid Surgery   [3]   Allergies  Allergen Reactions    Hydromorphone Itching and Other    Ibuprofen Itching and Other    Topiramate Itching and Other    Tramadol Itching and Other   [4]   Social History  Tobacco Use    Smoking status: Every Day     Current packs/day: 0.50     Average packs/day: 0.5 packs/day for 50.0 years (25.0 ttl pk-yrs)     Types: Cigarettes     Passive exposure: Never    Smokeless tobacco: Never    Tobacco comments:     3 packs in a week   Vaping Use    Vaping status: Never Used   Substance Use Topics    Alcohol use: Yes     Alcohol/week: 9.0 standard drinks of alcohol     Types: 7 Cans of beer, 2 Shots of liquor per week     Comment: drink beer every day    Drug use: Never   [5]   Family History  Problem Relation Name Age of Onset    Diabetes Sister      Other (TYPE C VIRAL HEPATITIS) Brother

## 2025-06-21 DIAGNOSIS — L29.9 PRURITUS: ICD-10-CM

## 2025-06-23 RX ORDER — HYDROXYZINE HYDROCHLORIDE 10 MG/1
10 TABLET, FILM COATED ORAL AS NEEDED
Qty: 30 TABLET | Refills: 0 | Status: SHIPPED | OUTPATIENT
Start: 2025-06-23

## 2025-06-26 ENCOUNTER — HOSPITAL ENCOUNTER (OUTPATIENT)
Dept: CARDIOLOGY | Facility: HOSPITAL | Age: 65
Discharge: HOME | End: 2025-06-26
Payer: MEDICAID

## 2025-06-26 DIAGNOSIS — R55 SYNCOPE, UNSPECIFIED SYNCOPE TYPE: ICD-10-CM

## 2025-06-26 DIAGNOSIS — R94.31 ABNORMAL ELECTROCARDIOGRAM (ECG) (EKG): ICD-10-CM

## 2025-06-26 DIAGNOSIS — R00.1 BRADYCARDIA: ICD-10-CM

## 2025-06-26 LAB
AORTIC VALVE MEAN GRADIENT: 6 MMHG
AORTIC VALVE PEAK VELOCITY: 1.8 M/S
AV PEAK GRADIENT: 13 MMHG
AVA (PEAK VEL): 2.03 CM2
AVA (VTI): 2.31 CM2
EJECTION FRACTION APICAL 4 CHAMBER: 64.2
EJECTION FRACTION: 62 %
LEFT ATRIUM VOLUME AREA LENGTH INDEX BSA: 33.9 ML/M2
LEFT VENTRICLE INTERNAL DIMENSION DIASTOLE: 5.1 CM (ref 3.5–6)
LEFT VENTRICULAR OUTFLOW TRACT DIAMETER: 2.19 CM
MITRAL VALVE E/A RATIO: 1.05
RIGHT VENTRICLE PEAK SYSTOLIC PRESSURE: 18 MMHG

## 2025-06-26 PROCEDURE — 93306 TTE W/DOPPLER COMPLETE: CPT

## 2025-06-26 PROCEDURE — 93270 REMOTE 30 DAY ECG REV/REPORT: CPT

## 2025-06-27 ENCOUNTER — HOSPITAL ENCOUNTER (OUTPATIENT)
Dept: RADIOLOGY | Facility: HOSPITAL | Age: 65
Discharge: HOME | End: 2025-06-27
Payer: MEDICAID

## 2025-06-27 DIAGNOSIS — R55 SYNCOPE, UNSPECIFIED SYNCOPE TYPE: ICD-10-CM

## 2025-06-27 PROCEDURE — 70450 CT HEAD/BRAIN W/O DYE: CPT

## 2025-06-27 PROCEDURE — 70450 CT HEAD/BRAIN W/O DYE: CPT | Performed by: RADIOLOGY

## 2025-07-22 ENCOUNTER — RESULTS FOLLOW-UP (OUTPATIENT)
Facility: HOSPITAL | Age: 65
End: 2025-07-22
Payer: MEDICAID

## 2025-07-22 DIAGNOSIS — R55 SYNCOPE, UNSPECIFIED SYNCOPE TYPE: Primary | ICD-10-CM

## 2025-07-22 NOTE — TELEPHONE ENCOUNTER
"I reviewed Dr. Smith's last note from  6/20/25 who had ordered echo and Holter monitor for falls with possible syncope.  I called patient and discussed Holter and echo results.  Informed that her echo was normal.  Also discussed that her Holter was predominantly in sinus rhythm however I would like her to see a specialist for possible further cardiac stress testing.  Otherwise I asked her about her alcohol intake and patient reports that she still continues to drink beer, when I tried to  her on decreasing intake patient said \"okay\" and hung up the phone.  "

## 2025-08-05 PROCEDURE — RXMED WILLOW AMBULATORY MEDICATION CHARGE

## 2025-08-07 ENCOUNTER — PHARMACY VISIT (OUTPATIENT)
Dept: PHARMACY | Facility: CLINIC | Age: 65
End: 2025-08-07
Payer: MEDICAID

## 2025-08-26 ENCOUNTER — TELEPHONE (OUTPATIENT)
Dept: RADIOLOGY | Facility: HOSPITAL | Age: 65
End: 2025-08-26
Payer: MEDICAID

## 2025-08-26 DIAGNOSIS — R91.8 PULMONARY NODULES: Primary | ICD-10-CM

## 2025-09-03 ENCOUNTER — APPOINTMENT (OUTPATIENT)
Dept: CARDIOLOGY | Facility: HOSPITAL | Age: 65
End: 2025-09-03
Payer: MEDICAID

## 2025-09-04 ENCOUNTER — TELEPHONE (OUTPATIENT)
Facility: HOSPITAL | Age: 65
End: 2025-09-04
Payer: MEDICAID

## 2025-09-04 DIAGNOSIS — K59.00 CONSTIPATION, UNSPECIFIED CONSTIPATION TYPE: ICD-10-CM

## 2025-09-04 PROCEDURE — RXMED WILLOW AMBULATORY MEDICATION CHARGE

## 2025-09-04 RX ORDER — BISACODYL 5 MG
5 TABLET, DELAYED RELEASE (ENTERIC COATED) ORAL DAILY PRN
Qty: 30 TABLET | Refills: 11 | Status: SHIPPED | OUTPATIENT
Start: 2025-09-04

## 2025-09-05 ENCOUNTER — PHARMACY VISIT (OUTPATIENT)
Dept: PHARMACY | Facility: CLINIC | Age: 65
End: 2025-09-05
Payer: MEDICAID

## 2025-10-02 ENCOUNTER — APPOINTMENT (OUTPATIENT)
Age: 65
End: 2025-10-02
Payer: MEDICAID

## (undated) DEVICE — Device